# Patient Record
Sex: FEMALE | Race: WHITE | NOT HISPANIC OR LATINO | Employment: UNEMPLOYED | URBAN - METROPOLITAN AREA
[De-identification: names, ages, dates, MRNs, and addresses within clinical notes are randomized per-mention and may not be internally consistent; named-entity substitution may affect disease eponyms.]

---

## 2021-08-02 ENCOUNTER — OFFICE VISIT (OUTPATIENT)
Dept: FAMILY MEDICINE CLINIC | Facility: CLINIC | Age: 21
End: 2021-08-02

## 2021-08-02 ENCOUNTER — TELEPHONE (OUTPATIENT)
Dept: FAMILY MEDICINE CLINIC | Facility: CLINIC | Age: 21
End: 2021-08-02

## 2021-08-02 VITALS
DIASTOLIC BLOOD PRESSURE: 77 MMHG | OXYGEN SATURATION: 96 % | BODY MASS INDEX: 19.29 KG/M2 | HEART RATE: 76 BPM | SYSTOLIC BLOOD PRESSURE: 115 MMHG | HEIGHT: 66 IN | WEIGHT: 120 LBS

## 2021-08-02 DIAGNOSIS — L70.0 ACNE VULGARIS: Primary | ICD-10-CM

## 2021-08-02 PROCEDURE — 99203 OFFICE O/P NEW LOW 30 MIN: CPT | Performed by: NURSE PRACTITIONER

## 2021-08-02 RX ORDER — DOXYCYCLINE 100 MG/1
100 TABLET ORAL 2 TIMES DAILY
Qty: 20 TABLET | Refills: 0 | Status: SHIPPED | OUTPATIENT
Start: 2021-08-02 | End: 2021-08-02 | Stop reason: CLARIF

## 2021-08-02 RX ORDER — DOXYCYCLINE HYCLATE 100 MG/1
100 CAPSULE ORAL 2 TIMES DAILY
Qty: 20 CAPSULE | Refills: 0 | Status: SHIPPED | OUTPATIENT
Start: 2021-08-02 | End: 2021-08-04 | Stop reason: CLARIF

## 2021-08-02 NOTE — PATIENT INSTRUCTIONS
Acne    Acne is a skin problem that causes small, red bumps (pimples) and other skin changes. The skin has tiny holes called pores. Each pore has an oil gland. Acne happens when the pores get blocked. The pores may become red, sore, and swollen. They may also become infected. Acne is common among teenagers. Acne usually goes away over time.  What are the causes?  This condition may be caused when:  · Oil glands get blocked by oil, dead skin cells, and dirt.  · Bacteria that live in the oil glands increase in number and cause infection.  Acne can start with changes in hormones. These changes can occur:  · When children mature into their teens (adolescence).  · When women get their period (menstrual cycle).  · When women are pregnant.  Some things can make acne worse. They include:  · Cosmetics and hair products that have oil in them.  · Stress.  · Diseases that cause changes in hormones.  · Some medicines.  · Headbands, backpacks, or shoulder pads.  · Being near certain oils and chemicals.  · Foods that are high in sugars. These include dairy products, sweets, and chocolates.  What increases the risk?  You are more likely to develop this condition if:  · You are a teenager.  · You have a family history of acne.  What are the signs or symptoms?  Symptoms of this condition include:  · Small, red bumps (pimples or papules).  · Whiteheads.  · Blackheads.  · Small, pus-filled pimples (pustules).  · Big, red pimples or pustules that feel tender.  Acne that is very bad can cause:  · An abscess. This is an area that has pus.  · Cysts. These are hard, painful sacs that have fluid.  · Scars. These can happen after large pimples heal.  How is this treated?  Treatment for this condition depends on how bad your acne is. It may include:  · Creams and lotions. These can:  ? Keep the pores of your skin open.  ? Prevent infections and swelling.  · Medicines that treat infections (antibiotics). These can be put on your skin or taken  as pills.  · Pills that decrease the amount of oil in your skin.  · Birth control pills.  · Light or laser treatments.  · Shots of medicine into the areas with acne.  · Chemicals that cause the skin to peel.  · Surgery.  Follow these instructions at home:  Good skin care is the most important thing you can do to treat your acne. Take care of your skin as told by your doctor. You may be told to do these things:  · Wash your skin gently at least two times each day. You should also wash your skin:  ? After you exercise.  ? Before you go to bed.  · Use mild soap.  · Use a water-based skin moisturizer after you wash your skin.  · Use a sunscreen or sunblock with SPF 30 or greater. This is very important if you are using acne medicines.  · Choose cosmetics that will not block your oil glands (are noncomedogenic).  Medicines  · Take over-the-counter and prescription medicines only as told by your doctor.  · If you were prescribed an antibiotic medicine, use it or take it as told by your doctor. Do not stop using the antibiotic even if your acne gets better.  General instructions  · Keep your hair clean and off your face. Shampoo your hair on a regular basis. If you have oily hair, you may need to wash it every day.  · Avoid wearing tight headbands or hats.  · Avoid picking or squeezing your pimples. That can make your acne worse and cause it to scar.  · Shave gently. Only shave when you have to.  · Keep a food journal. This can help you see if any foods are linked to your acne.  · Keep all follow-up visits as told by your doctor. This is important.  Contact a doctor if:  · Your acne is not better after eight weeks.  · Your acne gets worse.  · You have a large area of skin that is red or tender.  · You think that you are having side effects from any acne medicine.  Summary  · Acne is a skin problem that causes pimples. Acne is common among teenagers. Acne usually goes away over time.  · Acne starts with changes in your  hormones. Other causes include stress, diet, and some medicines.  · Follow your doctor's instructions on how to take care of your skin. Good skin care is the most important thing you can do to treat your acne.  · Take over-the-counter and prescription medicines only as told by your doctor.  · Contact your doctor if you think that you are having side effects from any acne medicine.  This information is not intended to replace advice given to you by your health care provider. Make sure you discuss any questions you have with your health care provider.  Document Revised: 04/30/2019 Document Reviewed: 04/30/2019  Elsevier Patient Education © 2021 [employer]

## 2021-08-02 NOTE — TELEPHONE ENCOUNTER
[facility] CALLED AND STATED PATIENTS INSURANCE WILL ONLY COVER CAPSULES  FOR doxycycline (ADOXA). PHARMACY WOULD LIKE A NEW RX FOR CAPSULES AND NOT TABLETS     CALL BACK   [phone]

## 2021-08-02 NOTE — PROGRESS NOTES
Subjective   Jing Vazquez is a 20 y.o. female.       HPI   Pt. is new to our office today.   Medical/social/family hx reviewed.   No previous PCP.   Immunizations UTD.   Has epilepsy and is followed by peds neuro at [facility].  They have her doing a trial without meds.  Has not had a seizure in several months.    She has concern with acne; mainly face, some on back for months.  Has used several products otc without relief.  Uses a grapefruit scrub to wash her face daily; also using an astringent and acne pads daily.  Does use makeup; mixes foundations in order to cover acne.      The following portions of the patient's history were reviewed and updated as appropriate: allergies, current medications, past family history, past medical history, past social history, past surgical history and problem list.    Review of Systems   Constitutional: Negative for activity change, appetite change, chills, diaphoresis, fatigue, fever, unexpected weight gain and unexpected weight loss.   Eyes: Negative for visual disturbance.   Respiratory: Negative for cough, chest tightness, shortness of breath and wheezing.    Cardiovascular: Negative for chest pain, palpitations and leg swelling.   Gastrointestinal: Negative for abdominal pain, blood in stool, constipation, diarrhea, nausea, vomiting and indigestion.   Genitourinary: Negative for dysuria, flank pain, frequency and urgency.   Musculoskeletal: Negative for arthralgias, back pain and myalgias.   Skin: Positive for rash (acne).   Neurological: Negative for dizziness, seizures, syncope, weakness, light-headedness, headache and confusion.   Psychiatric/Behavioral: Negative for depressed mood. The patient is not nervous/anxious.        Objective   Physical Exam  Vitals reviewed.   Constitutional:       General: She is not in acute distress.     Appearance: Normal appearance.   HENT:      Head: Normocephalic and atraumatic.   Cardiovascular:      Rate and Rhythm: Normal rate and  regular rhythm.      Pulses: Normal pulses.      Heart sounds: Normal heart sounds. No murmur heard.     Pulmonary:      Effort: Pulmonary effort is normal. No respiratory distress.      Breath sounds: Normal breath sounds. No wheezing.   Chest:      Chest wall: No tenderness.   Abdominal:      Tenderness: There is no right CVA tenderness or left CVA tenderness.   Musculoskeletal:      Cervical back: Normal range of motion and neck supple. No tenderness.      Right lower leg: No edema.      Left lower leg: No edema.   Skin:     General: Skin is warm and dry.      Findings: No erythema.      Comments: Multiple acne comedones noted across forehead and cheeks; a few noted on upper back.     Neurological:      General: No focal deficit present.      Mental Status: She is alert and oriented to person, place, and time.   Psychiatric:         Mood and Affect: Mood normal.         Speech: Speech normal.         Behavior: Behavior normal. Behavior is cooperative.         Thought Content: Thought content normal.           Assessment/Plan   Diagnoses and all orders for this visit:    1. Acne vulgaris (Primary)  Comments:  Given doxycycline.   Encouarged to use Cetaphil wash and a non-comdogenic moisturizer daily. Avoid harsh/abrasive washes.   Referral to Derm  Orders:  -     Ambulatory Referral to Dermatology  -     doxycycline (ADOXA) 100 MG tablet; Take 1 tablet by mouth 2 (Two) Times a Day for 10 days.  Dispense: 20 tablet; Refill: 0

## 2021-08-04 ENCOUNTER — TELEPHONE (OUTPATIENT)
Dept: FAMILY MEDICINE CLINIC | Facility: CLINIC | Age: 21
End: 2021-08-04

## 2021-08-04 DIAGNOSIS — L70.0 ACNE VULGARIS: Primary | ICD-10-CM

## 2021-08-04 RX ORDER — CEPHALEXIN 500 MG/1
500 CAPSULE ORAL 2 TIMES DAILY
Qty: 28 CAPSULE | Refills: 0 | Status: SHIPPED | OUTPATIENT
Start: 2021-08-04 | End: 2021-08-18

## 2021-08-04 NOTE — TELEPHONE ENCOUNTER
PATIENTS GRANDMOTHER SARAVANAN STATES THE PHARMACY WONT FILL THE MED>doxycycline (VIBRAMYCIN) 100 MG capsule  BAILEE PRESCRIBED PATIENT ON 08/02 BECAUSE IT ISN'T COVERED BY HER INSURANCE. THE PHARMACY INSTRUCTED THEM TO CALL US HER PCP AND REQUEST US TO CALL INSURANCE AND EXPLAIN WHY ITS NECESSARY.... PLEASE STRAIGHTEN IT OUT SO PATIENT CAN BEGIN MED ASAP.    PATIENTS GRANDMOTHER CAN BE REACHED AT: [phone]

## 2021-08-04 NOTE — TELEPHONE ENCOUNTER
The tabs were initially sent in but pharmacy sent a message earlier in the week that only capsules would be covered.  I have just sent a different abx in for her.

## 2021-09-20 ENCOUNTER — OFFICE VISIT (OUTPATIENT)
Dept: FAMILY MEDICINE CLINIC | Facility: CLINIC | Age: 21
End: 2021-09-20

## 2021-09-20 VITALS
TEMPERATURE: 98.2 F | SYSTOLIC BLOOD PRESSURE: 113 MMHG | HEART RATE: 92 BPM | DIASTOLIC BLOOD PRESSURE: 74 MMHG | RESPIRATION RATE: 16 BRPM | OXYGEN SATURATION: 97 % | WEIGHT: 123 LBS | BODY MASS INDEX: 19.77 KG/M2 | HEIGHT: 66 IN

## 2021-09-20 DIAGNOSIS — Z00.00 ROUTINE ADULT HEALTH MAINTENANCE: Primary | ICD-10-CM

## 2021-09-20 PROBLEM — G40.A09 JUVENILE ABSENCE EPILEPSY (HCC): Status: ACTIVE | Noted: 2021-09-20

## 2021-09-20 PROCEDURE — 99395 PREV VISIT EST AGE 18-39: CPT | Performed by: PHYSICIAN ASSISTANT

## 2021-09-20 RX ORDER — DOXYCYCLINE 100 MG/1
100 CAPSULE ORAL 2 TIMES DAILY
COMMUNITY
Start: 2021-08-05

## 2021-09-20 NOTE — PROGRESS NOTES
"Subjective   Jing Vazquez is a 20 y.o. female.     Pt is here for her annual physical.  She is applying to the hospital to be a COVID-19 screening.  She has had the COVID-19 vaccine.  Sleeping well.  Eats well but not exercising.  Has hx of epilepsy but no seizures since 7th or 8th grade.  LMP is current.         The following portions of the patient's history were reviewed and updated as appropriate: allergies, current medications, past family history, past medical history, past social history, past surgical history and problem list.  Past Medical History:   Diagnosis Date   • Epilepsy (CMS/[facility])     Lorena Napoles at [facility]     No past surgical history on file.  No family history on file.  Social History     Socioeconomic History   • Marital status: Single     Spouse name: Not on file   • Number of children: Not on file   • Years of education: Not on file   • Highest education level: Not on file   Tobacco Use   • Smoking status: Never Smoker   • Smokeless tobacco: Never Used   Substance and Sexual Activity   • Alcohol use: Never   • Drug use: Never         Current Outpatient Medications:   •  doxycycline (MONODOX) 100 MG capsule, Take 100 mg by mouth 2 (Two) Times a Day., Disp: , Rfl:     Review of Systems   Constitutional: Negative for activity change, appetite change, chills, diaphoresis, fatigue, fever, unexpected weight gain and unexpected weight loss.   Respiratory: Negative for cough, chest tightness and shortness of breath.    Cardiovascular: Negative for chest pain, palpitations and leg swelling.   Gastrointestinal: Negative for abdominal pain, constipation, diarrhea, nausea and vomiting.   Musculoskeletal: Negative for gait problem.   Neurological: Negative for dizziness, seizures, weakness, light-headedness, headache and confusion.     /74 (BP Location: Right arm, Patient Position: Sitting, Cuff Size: Adult)   Pulse 92   Temp 98.2 °F (36.8 °C) (Temporal)   Resp 16   Ht 167.6 cm (66\") "   Wt 55.8 kg (123 lb)   SpO2 97%   BMI 19.85 kg/m²       Objective   Physical Exam  Vitals and nursing note reviewed.   Constitutional:       General: She is not in acute distress.     Appearance: Normal appearance. She is normal weight.   HENT:      Head: Normocephalic and atraumatic.      Right Ear: Tympanic membrane, ear canal and external ear normal.      Left Ear: Tympanic membrane, ear canal and external ear normal.      Nose: Nose normal.      Mouth/Throat:      Mouth: Mucous membranes are moist.      Pharynx: Oropharynx is clear.   Eyes:      Extraocular Movements: Extraocular movements intact.      Conjunctiva/sclera: Conjunctivae normal.      Pupils: Pupils are equal, round, and reactive to light.   Neck:      Thyroid: No thyroid mass, thyromegaly or thyroid tenderness.   Cardiovascular:      Rate and Rhythm: Normal rate and regular rhythm.      Heart sounds: Normal heart sounds.   Pulmonary:      Effort: Pulmonary effort is normal. No respiratory distress.      Breath sounds: Normal breath sounds. No wheezing, rhonchi or rales.   Abdominal:      General: Abdomen is flat. Bowel sounds are normal. There is no distension.      Palpations: Abdomen is soft. There is no mass.      Tenderness: There is no abdominal tenderness.   Musculoskeletal:         General: Normal range of motion.      Cervical back: Normal range of motion and neck supple.      Right lower leg: No edema.      Left lower leg: No edema.   Skin:     General: Skin is warm and dry.   Neurological:      General: No focal deficit present.      Mental Status: She is alert and oriented to person, place, and time.   Psychiatric:         Mood and Affect: Mood normal.         Behavior: Behavior normal.         Thought Content: Thought content normal.         Procedures     Assessment/Plan   Diagnoses and all orders for this visit:    1. Routine adult health maintenance (Primary)      Routine physical done today.  Encouraged her to work on adding in  regular exercise at least 5 days per week 20 minutes each time.    Pt not sure if she needs to go to [facility] for labs for preemployment but will find out and let me know.  If she can get them done here, I will order them and she can do them here.

## 2021-12-02 ENCOUNTER — TRANSCRIBE ORDERS (OUTPATIENT)
Dept: ADMINISTRATIVE | Facility: HOSPITAL | Age: 21
End: 2021-12-02

## 2021-12-02 ENCOUNTER — LAB (OUTPATIENT)
Dept: LAB | Facility: HOSPITAL | Age: 21
End: 2021-12-02

## 2021-12-02 DIAGNOSIS — Z11.52 ENCOUNTER FOR SCREENING FOR SEVERE ACUTE RESPIRATORY SYNDROME CORONAVIRUS 2 (SARS-COV-2) INFECTION: ICD-10-CM

## 2021-12-02 DIAGNOSIS — Z11.52 ENCOUNTER FOR SCREENING FOR SEVERE ACUTE RESPIRATORY SYNDROME CORONAVIRUS 2 (SARS-COV-2) INFECTION: Primary | ICD-10-CM

## 2021-12-02 LAB — SARS-COV-2 ORF1AB RESP QL NAA+PROBE: DETECTED

## 2021-12-02 PROCEDURE — C9803 HOPD COVID-19 SPEC COLLECT: HCPCS

## 2021-12-02 PROCEDURE — U0004 COV-19 TEST NON-CDC HGH THRU: HCPCS

## 2024-01-09 ENCOUNTER — OFFICE VISIT (OUTPATIENT)
Dept: FAMILY MEDICINE CLINIC | Facility: CLINIC | Age: 24
End: 2024-01-09
Payer: MEDICAID

## 2024-01-09 VITALS
HEIGHT: 66 IN | TEMPERATURE: 98.7 F | WEIGHT: 115 LBS | HEART RATE: 67 BPM | SYSTOLIC BLOOD PRESSURE: 132 MMHG | OXYGEN SATURATION: 100 % | DIASTOLIC BLOOD PRESSURE: 85 MMHG | BODY MASS INDEX: 18.48 KG/M2

## 2024-01-09 DIAGNOSIS — Z30.09 BIRTH CONTROL COUNSELING: Primary | ICD-10-CM

## 2024-01-09 DIAGNOSIS — Z13.41 ENCOUNTER FOR AUTISM SCREENING: ICD-10-CM

## 2024-01-09 LAB
B-HCG UR QL: NEGATIVE
EXPIRATION DATE: NORMAL
INTERNAL NEGATIVE CONTROL: NORMAL
INTERNAL POSITIVE CONTROL: NORMAL
Lab: NORMAL

## 2024-01-09 PROCEDURE — 99213 OFFICE O/P EST LOW 20 MIN: CPT | Performed by: NURSE PRACTITIONER

## 2024-01-09 PROCEDURE — 81025 URINE PREGNANCY TEST: CPT | Performed by: NURSE PRACTITIONER

## 2024-01-09 RX ORDER — MEDROXYPROGESTERONE ACETATE 150 MG/ML
150 INJECTION, SUSPENSION INTRAMUSCULAR ONCE
Status: DISCONTINUED | OUTPATIENT
Start: 2024-01-09 | End: 2024-01-09

## 2024-01-09 RX ORDER — MEDROXYPROGESTERONE ACETATE 150 MG/ML
150 INJECTION, SUSPENSION INTRAMUSCULAR ONCE
Status: COMPLETED | OUTPATIENT
Start: 2024-01-09 | End: 2024-01-09

## 2024-01-09 RX ADMIN — MEDROXYPROGESTERONE ACETATE 150 MG: 150 INJECTION, SUSPENSION INTRAMUSCULAR at 16:01

## 2024-01-09 NOTE — PROGRESS NOTES
"Chief Complaint  Contraception and Autism testing    Subjective        Jing Vazquez presents to [facility]  History of Present Illness  Jing is a 23-year-old female presenting today to discuss birth control options. She is interested in the depo shot. She previously tried Nexplanon while in high school and had negative side effects. She just got into a relationship and would like to start birth control. She is not currently sexually active. She denies any history/family history of breast cancer. She would also like to get tested for autism.        The following portions of the patient's history were reviewed and updated as appropriate: allergies, current medications, past family history, past medical history, past social history, past surgical history and problem list.    No Known Allergies    Patient Active Problem List   Diagnosis    Juvenile absence epilepsy       Current Outpatient Medications   Medication Instructions    doxycycline (MONODOX) 100 mg, 2 Times Daily          Objective   Vital Signs:  /85 (BP Location: Left arm, Patient Position: Sitting, Cuff Size: Adult)   Pulse 67   Temp 98.7 °F (37.1 °C) (Temporal)   Ht 167.6 cm (66\")   Wt 52.2 kg (115 lb)   SpO2 100%   BMI 18.56 kg/m²   Estimated body mass index is 18.56 kg/m² as calculated from the following:    Height as of this encounter: 167.6 cm (66\").    Weight as of this encounter: 52.2 kg (115 lb).             Review of Systems   Constitutional:  Negative for appetite change, diaphoresis, fatigue and unexpected weight change.   Eyes:  Negative for visual disturbance.   Respiratory:  Negative for cough, chest tightness, shortness of breath and wheezing.    Cardiovascular:  Negative for chest pain, palpitations and leg swelling.   Gastrointestinal:  Negative for nausea and vomiting.   Musculoskeletal:  Negative for back pain and gait problem.   Neurological:  Negative for dizziness, syncope, weakness, " light-headedness, numbness and headaches.   Psychiatric/Behavioral:  Negative for confusion. The patient is not nervous/anxious.         Physical Exam  Constitutional:       Appearance: Normal appearance.   Cardiovascular:      Rate and Rhythm: Normal rate and regular rhythm.      Pulses: Normal pulses.      Heart sounds: Normal heart sounds.   Pulmonary:      Effort: Pulmonary effort is normal.      Breath sounds: Normal breath sounds.   Abdominal:      General: Abdomen is flat. Bowel sounds are normal.      Palpations: Abdomen is soft.   Skin:     General: Skin is warm and dry.      Capillary Refill: Capillary refill takes less than 2 seconds.   Neurological:      Mental Status: She is alert and oriented to person, place, and time.   Psychiatric:         Mood and Affect: Mood normal.         Behavior: Behavior normal.         Thought Content: Thought content normal.         Judgment: Judgment normal.        Result Review :                   Assessment and Plan   Diagnoses and all orders for this visit:    1. Birth control counseling (Primary)  Comments:  HCG negative  Depo given  discussed side effects and dosing schedule  pt voiced understanding  Orders:  -     Ambulatory Referral to Obstetrics / Gynecology  -     POC Pregnancy, Urine    2. Encounter for autism screening  Comments:  referral made for testing  Orders:  -     Ambulatory Referral to Psychotherapy             Follow Up   Return in about 13 weeks (around 4/9/2024) for nurse visit for depo shot.  Patient was given instructions and counseling regarding her condition or for health maintenance advice. Please see specific information pulled into the AVS if appropriate.

## 2024-04-16 ENCOUNTER — CLINICAL SUPPORT (OUTPATIENT)
Dept: FAMILY MEDICINE CLINIC | Facility: CLINIC | Age: 24
End: 2024-04-16
Payer: COMMERCIAL

## 2024-04-16 DIAGNOSIS — Z30.09 BIRTH CONTROL COUNSELING: Primary | ICD-10-CM

## 2024-04-16 PROCEDURE — 96372 THER/PROPH/DIAG INJ SC/IM: CPT | Performed by: NURSE PRACTITIONER

## 2024-04-16 RX ORDER — MEDROXYPROGESTERONE ACETATE 150 MG/ML
150 INJECTION, SUSPENSION INTRAMUSCULAR ONCE
Status: COMPLETED | OUTPATIENT
Start: 2024-04-16 | End: 2024-04-16

## 2024-04-16 RX ADMIN — MEDROXYPROGESTERONE ACETATE 150 MG: 150 INJECTION, SUSPENSION INTRAMUSCULAR at 11:28

## 2024-06-17 ENCOUNTER — HOSPITAL ENCOUNTER (EMERGENCY)
Facility: HOSPITAL | Age: 24
Discharge: HOME | End: 2024-06-17
Attending: EMERGENCY MEDICINE | Admitting: EMERGENCY MEDICINE
Payer: COMMERCIAL

## 2024-06-17 VITALS
BODY MASS INDEX: 18.48 KG/M2 | HEART RATE: 87 BPM | WEIGHT: 115 LBS | RESPIRATION RATE: 16 BRPM | DIASTOLIC BLOOD PRESSURE: 95 MMHG | SYSTOLIC BLOOD PRESSURE: 140 MMHG | TEMPERATURE: 98 F | HEIGHT: 66 IN | OXYGEN SATURATION: 100 %
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
## 2024-06-17 DIAGNOSIS — Z77.21 EXPOSURE TO BODY FLUIDS BY CONTAMINATED HYPODERMIC NEEDLE STICK: Primary | ICD-10-CM

## 2024-06-17 LAB
HAV IGM SERPL QL IA: NORMAL
HBV CORE IGM SERPL QL IA: NORMAL
HBV SURFACE AG SERPL QL IA: NORMAL
HCV AB SER QL: NORMAL
HIV 1+2 AB+HIV1 P24 AG SERPL QL IA: NORMAL

## 2024-06-17 PROCEDURE — 36415 COLL VENOUS BLD VENIPUNCTURE: CPT

## 2024-06-17 PROCEDURE — 99283 EMERGENCY DEPT VISIT LOW MDM: CPT

## 2024-06-17 PROCEDURE — 80074 ACUTE HEPATITIS PANEL: CPT | Performed by: EMERGENCY MEDICINE

## 2024-06-17 PROCEDURE — G0432 EIA HIV-1/HIV-2 SCREEN: HCPCS | Performed by: EMERGENCY MEDICINE

## 2024-06-17 NOTE — ED PROVIDER NOTES
"Subjective   History of Present Illness  23-year-old female presents after getting right fifth digit stuck with a needle when picking up a trash bag.  Needle sticking out of it.  Had very slight amount of bleeding.  No numbness or weakness.  Review of Systems  See HPI.  Past Medical History:   Diagnosis Date    Epilepsy     Lorena Napoles at [facility]       No Known Allergies    History reviewed. No pertinent surgical history.    History reviewed. No pertinent family history.    Social History     Socioeconomic History    Marital status: Single   Tobacco Use    Smoking status: Never     Passive exposure: Never    Smokeless tobacco: Never   Substance and Sexual Activity    Alcohol use: Never    Drug use: Never           Objective   Physical Exam  Punctate wound on right fifth digit.  No active bleeding.  Sensation intact to light touch distally.  Normal range of motion.  Procedures           ED Course      /95 (BP Location: Left arm, Patient Position: Sitting)   Pulse 87   Temp 98 °F (36.7 °C) (Oral)   Resp 16   Ht 167.6 cm (66\")   Wt 52.2 kg (115 lb)   SpO2 100%   BMI 18.56 kg/m²   Labs Reviewed   HEPATITIS PANEL, ACUTE - Normal    Narrative:     Results may be falsely decreased if patient taking Biotin.    HIV-1/O/2 ANTIGEN/ANTIBODY - Normal    Narrative:     The HIV antibody/antigen combo assay is a qualitative assay for HIV that includes the p24 antigen as well as antibodies to HIV types 1 and 2. This test is intended to be used as a screening assay in the diagnosis of HIV infection in patients over the age of 2.   HIV-1 AND HIV-2 ANTIBODIES    Narrative:     The following orders were created for panel order HIV-1 & HIV-2 Antibodies.  Procedure                               Abnormality         Status                     ---------                               -----------         ------                     HIV-1 / O / 2 Ag / Antibody[[id number]]  Normal              Final result             "     Please view results for these tests on the individual orders.     Medications - No data to display  No radiology results for the last day                                         Medical Decision Making  Problems Addressed:  Exposure to body fluids by contaminated hypodermic needle stick: acute illness or injury    Discussed risks and benefits of HIV prophylaxis and recommended against giving nature of needlestick.  Patient agreeable with this plan.  DC'd home.  Recommended repeat testing in 3 months.    Final diagnoses:   Exposure to body fluids by contaminated hypodermic needle stick       ED Disposition  ED Disposition       ED Disposition   Discharge    Condition   Stable    Comment   --               Kelley Elmore, APRN  [address] [place] [id number] [phone]      As needed         Medication List      No changes were made to your prescriptions during this visit.            Owen Hsu MD  06/17/24 1004

## 2024-07-18 ENCOUNTER — CLINICAL SUPPORT (OUTPATIENT)
Dept: FAMILY MEDICINE CLINIC | Facility: CLINIC | Age: 24
End: 2024-07-18
Payer: COMMERCIAL

## 2024-07-18 DIAGNOSIS — Z30.42 ENCOUNTER FOR SURVEILLANCE OF INJECTABLE CONTRACEPTIVE: Primary | ICD-10-CM

## 2024-07-18 PROCEDURE — 96372 THER/PROPH/DIAG INJ SC/IM: CPT

## 2024-07-18 PROCEDURE — 81025 URINE PREGNANCY TEST: CPT

## 2024-07-18 RX ORDER — MEDROXYPROGESTERONE ACETATE 150 MG/ML
150 INJECTION, SUSPENSION INTRAMUSCULAR ONCE
Status: COMPLETED | OUTPATIENT
Start: 2024-07-18 | End: 2024-07-18

## 2024-07-18 RX ADMIN — MEDROXYPROGESTERONE ACETATE 150 MG: 150 INJECTION, SUSPENSION INTRAMUSCULAR at 14:27

## 2024-10-24 ENCOUNTER — CLINICAL SUPPORT (OUTPATIENT)
Dept: FAMILY MEDICINE CLINIC | Facility: CLINIC | Age: 24
End: 2024-10-24
Payer: COMMERCIAL

## 2024-10-24 DIAGNOSIS — Z30.42 ENCOUNTER FOR SURVEILLANCE OF INJECTABLE CONTRACEPTIVE: Primary | ICD-10-CM

## 2024-10-24 PROCEDURE — 96372 THER/PROPH/DIAG INJ SC/IM: CPT | Performed by: NURSE PRACTITIONER

## 2024-10-24 RX ORDER — MEDROXYPROGESTERONE ACETATE 150 MG/ML
150 INJECTION, SUSPENSION INTRAMUSCULAR ONCE
Status: COMPLETED | OUTPATIENT
Start: 2024-10-24 | End: 2024-10-24

## 2024-10-24 RX ADMIN — MEDROXYPROGESTERONE ACETATE 150 MG: 150 INJECTION, SUSPENSION INTRAMUSCULAR at 15:19

## 2024-11-24 ENCOUNTER — APPOINTMENT (OUTPATIENT)
Dept: MRI IMAGING | Facility: HOSPITAL | Age: 24
End: 2024-11-24
Payer: COMMERCIAL

## 2024-11-24 ENCOUNTER — HOSPITAL ENCOUNTER (OUTPATIENT)
Facility: HOSPITAL | Age: 24
Discharge: HOME | End: 2024-11-24
Attending: EMERGENCY MEDICINE | Admitting: NURSE PRACTITIONER
Payer: COMMERCIAL

## 2024-11-24 ENCOUNTER — APPOINTMENT (OUTPATIENT)
Dept: CT IMAGING | Facility: HOSPITAL | Age: 24
End: 2024-11-24
Payer: COMMERCIAL

## 2024-11-24 VITALS
WEIGHT: 108.47 LBS | RESPIRATION RATE: 21 BRPM | DIASTOLIC BLOOD PRESSURE: 83 MMHG | BODY MASS INDEX: 17.43 KG/M2 | HEART RATE: 83 BPM | OXYGEN SATURATION: 100 % | TEMPERATURE: 98.3 F | SYSTOLIC BLOOD PRESSURE: 123 MMHG | HEIGHT: 66 IN

## 2024-11-24 DIAGNOSIS — G44.209 ACUTE NON INTRACTABLE TENSION-TYPE HEADACHE: ICD-10-CM

## 2024-11-24 DIAGNOSIS — G40.A09 JUVENILE ABSENCE EPILEPSY: ICD-10-CM

## 2024-11-24 DIAGNOSIS — R47.9 SPEECH DISTURBANCE, UNSPECIFIED TYPE: Primary | ICD-10-CM

## 2024-11-24 LAB
ALBUMIN SERPL-MCNC: 5.3 G/DL (ref 3.5–5.2)
ALBUMIN/GLOB SERPL: 1.7 G/DL
ALP SERPL-CCNC: 65 U/L (ref 39–117)
ALT SERPL W P-5'-P-CCNC: 16 U/L (ref 1–33)
ANION GAP SERPL CALCULATED.3IONS-SCNC: 13.9 MMOL/L (ref 5–15)
AST SERPL-CCNC: 22 U/L (ref 1–32)
BASOPHILS # BLD AUTO: 0.04 10*3/MM3 (ref 0–0.2)
BASOPHILS NFR BLD AUTO: 0.7 % (ref 0–1.5)
BILIRUB SERPL-MCNC: 0.5 MG/DL (ref 0–1.2)
BUN SERPL-MCNC: 18 MG/DL (ref 6–20)
BUN/CREAT SERPL: 26.9 (ref 7–25)
CALCIUM SPEC-SCNC: 10.3 MG/DL (ref 8.6–10.5)
CHLORIDE SERPL-SCNC: 101 MMOL/L (ref 98–107)
CO2 SERPL-SCNC: 25.1 MMOL/L (ref 22–29)
CREAT SERPL-MCNC: 0.67 MG/DL (ref 0.57–1)
D-LACTATE SERPL-SCNC: 0.7 MMOL/L (ref 0.3–2)
DEPRECATED RDW RBC AUTO: 42.1 FL (ref 37–54)
EGFRCR SERPLBLD CKD-EPI 2021: 126.9 ML/MIN/1.73
EOSINOPHIL # BLD AUTO: 0.08 10*3/MM3 (ref 0–0.4)
EOSINOPHIL NFR BLD AUTO: 1.3 % (ref 0.3–6.2)
ERYTHROCYTE [DISTWIDTH] IN BLOOD BY AUTOMATED COUNT: 12.4 % (ref 12.3–15.4)
GLOBULIN UR ELPH-MCNC: 3.2 GM/DL
GLUCOSE SERPL-MCNC: 99 MG/DL (ref 65–99)
HCG SERPL QL: NEGATIVE
HCT VFR BLD AUTO: 44.3 % (ref 34–46.6)
HGB BLD-MCNC: 14.8 G/DL (ref 12–15.9)
IMM GRANULOCYTES # BLD AUTO: 0.02 10*3/MM3 (ref 0–0.05)
IMM GRANULOCYTES NFR BLD AUTO: 0.3 % (ref 0–0.5)
LYMPHOCYTES # BLD AUTO: 1.98 10*3/MM3 (ref 0.7–3.1)
LYMPHOCYTES NFR BLD AUTO: 32.7 % (ref 19.6–45.3)
MCH RBC QN AUTO: 30.9 PG (ref 26.6–33)
MCHC RBC AUTO-ENTMCNC: 33.4 G/DL (ref 31.5–35.7)
MCV RBC AUTO: 92.5 FL (ref 79–97)
MONOCYTES # BLD AUTO: 0.46 10*3/MM3 (ref 0.1–0.9)
MONOCYTES NFR BLD AUTO: 7.6 % (ref 5–12)
NEUTROPHILS NFR BLD AUTO: 3.48 10*3/MM3 (ref 1.7–7)
NEUTROPHILS NFR BLD AUTO: 57.4 % (ref 42.7–76)
NRBC BLD AUTO-RTO: 0 /100 WBC (ref 0–0.2)
PLATELET # BLD AUTO: 218 10*3/MM3 (ref 140–450)
PMV BLD AUTO: 8.8 FL (ref 6–12)
POTASSIUM SERPL-SCNC: 4.1 MMOL/L (ref 3.5–5.2)
PROT SERPL-MCNC: 8.5 G/DL (ref 6–8.5)
RBC # BLD AUTO: 4.79 10*6/MM3 (ref 3.77–5.28)
SODIUM SERPL-SCNC: 140 MMOL/L (ref 136–145)
T4 FREE SERPL-MCNC: 1.35 NG/DL (ref 0.93–1.7)
TSH SERPL DL<=0.05 MIU/L-ACNC: 1.47 UIU/ML (ref 0.27–4.2)
WBC NRBC COR # BLD AUTO: 6.06 10*3/MM3 (ref 3.4–10.8)

## 2024-11-24 PROCEDURE — 96374 THER/PROPH/DIAG INJ IV PUSH: CPT

## 2024-11-24 PROCEDURE — 84439 ASSAY OF FREE THYROXINE: CPT | Performed by: EMERGENCY MEDICINE

## 2024-11-24 PROCEDURE — 80050 GENERAL HEALTH PANEL: CPT | Performed by: EMERGENCY MEDICINE

## 2024-11-24 PROCEDURE — 99285 EMERGENCY DEPT VISIT HI MDM: CPT

## 2024-11-24 PROCEDURE — 25010000002 DIPHENHYDRAMINE PER 50 MG: Performed by: EMERGENCY MEDICINE

## 2024-11-24 PROCEDURE — 25010000002 DIAZEPAM PER 5 MG: Performed by: EMERGENCY MEDICINE

## 2024-11-24 PROCEDURE — G0378 HOSPITAL OBSERVATION PER HR: HCPCS

## 2024-11-24 PROCEDURE — 70450 CT HEAD/BRAIN W/O DYE: CPT

## 2024-11-24 PROCEDURE — 84703 CHORIONIC GONADOTROPIN ASSAY: CPT | Performed by: EMERGENCY MEDICINE

## 2024-11-24 PROCEDURE — 70551 MRI BRAIN STEM W/O DYE: CPT

## 2024-11-24 PROCEDURE — 96375 TX/PRO/DX INJ NEW DRUG ADDON: CPT

## 2024-11-24 PROCEDURE — 83605 ASSAY OF LACTIC ACID: CPT | Performed by: EMERGENCY MEDICINE

## 2024-11-24 PROCEDURE — 25010000002 METOCLOPRAMIDE PER 10 MG: Performed by: EMERGENCY MEDICINE

## 2024-11-24 RX ORDER — SODIUM CHLORIDE 0.9 % (FLUSH) 0.9 %
10 SYRINGE (ML) INJECTION AS NEEDED
Status: DISCONTINUED | OUTPATIENT
Start: 2024-11-24 | End: 2024-11-24 | Stop reason: HOSPADM

## 2024-11-24 RX ORDER — NITROGLYCERIN 0.4 MG/1
0.4 TABLET SUBLINGUAL
Status: DISCONTINUED | OUTPATIENT
Start: 2024-11-24 | End: 2024-11-24 | Stop reason: HOSPADM

## 2024-11-24 RX ORDER — ONDANSETRON 2 MG/ML
4 INJECTION INTRAMUSCULAR; INTRAVENOUS EVERY 6 HOURS PRN
Status: DISCONTINUED | OUTPATIENT
Start: 2024-11-24 | End: 2024-11-24 | Stop reason: HOSPADM

## 2024-11-24 RX ORDER — MEDROXYPROGESTERONE ACETATE 150 MG/ML
150 INJECTION, SUSPENSION INTRAMUSCULAR
COMMUNITY

## 2024-11-24 RX ORDER — ONDANSETRON 4 MG/1
4 TABLET, ORALLY DISINTEGRATING ORAL EVERY 6 HOURS PRN
Status: DISCONTINUED | OUTPATIENT
Start: 2024-11-24 | End: 2024-11-24 | Stop reason: HOSPADM

## 2024-11-24 RX ORDER — BISACODYL 10 MG
10 SUPPOSITORY, RECTAL RECTAL DAILY PRN
Status: DISCONTINUED | OUTPATIENT
Start: 2024-11-24 | End: 2024-11-24 | Stop reason: HOSPADM

## 2024-11-24 RX ORDER — DIPHENHYDRAMINE HYDROCHLORIDE 50 MG/ML
12.5 INJECTION INTRAMUSCULAR; INTRAVENOUS ONCE
Status: COMPLETED | OUTPATIENT
Start: 2024-11-24 | End: 2024-11-24

## 2024-11-24 RX ORDER — DIHYDROERGOTAMINE MESYLATE 1 MG/ML
0.5 INJECTION, SOLUTION INTRAMUSCULAR; INTRAVENOUS; SUBCUTANEOUS EVERY 8 HOURS PRN
Status: DISCONTINUED | OUTPATIENT
Start: 2024-11-25 | End: 2024-11-24 | Stop reason: HOSPADM

## 2024-11-24 RX ORDER — AMOXICILLIN 250 MG
2 CAPSULE ORAL 2 TIMES DAILY PRN
Status: DISCONTINUED | OUTPATIENT
Start: 2024-11-24 | End: 2024-11-24 | Stop reason: HOSPADM

## 2024-11-24 RX ORDER — BISACODYL 5 MG/1
5 TABLET, DELAYED RELEASE ORAL DAILY PRN
Status: DISCONTINUED | OUTPATIENT
Start: 2024-11-24 | End: 2024-11-24 | Stop reason: HOSPADM

## 2024-11-24 RX ORDER — METOCLOPRAMIDE HYDROCHLORIDE 5 MG/ML
5 INJECTION INTRAMUSCULAR; INTRAVENOUS ONCE
Status: COMPLETED | OUTPATIENT
Start: 2024-11-24 | End: 2024-11-24

## 2024-11-24 RX ORDER — ACETAMINOPHEN 325 MG/1
650 TABLET ORAL EVERY 4 HOURS PRN
Status: DISCONTINUED | OUTPATIENT
Start: 2024-11-24 | End: 2024-11-24 | Stop reason: HOSPADM

## 2024-11-24 RX ORDER — METOCLOPRAMIDE HYDROCHLORIDE 5 MG/ML
10 INJECTION INTRAMUSCULAR; INTRAVENOUS EVERY 6 HOURS PRN
Status: DISCONTINUED | OUTPATIENT
Start: 2024-11-25 | End: 2024-11-24 | Stop reason: HOSPADM

## 2024-11-24 RX ORDER — POLYETHYLENE GLYCOL 3350 17 G/17G
17 POWDER, FOR SOLUTION ORAL DAILY PRN
Status: DISCONTINUED | OUTPATIENT
Start: 2024-11-24 | End: 2024-11-24 | Stop reason: HOSPADM

## 2024-11-24 RX ORDER — BUTALBITAL, ACETAMINOPHEN AND CAFFEINE 50; 325; 40 MG/1; MG/1; MG/1
1 TABLET ORAL EVERY 4 HOURS PRN
Qty: 6 TABLET | Refills: 0 | Status: SHIPPED | OUTPATIENT
Start: 2024-11-24

## 2024-11-24 RX ORDER — DIAZEPAM 10 MG/2ML
5 INJECTION, SOLUTION INTRAMUSCULAR; INTRAVENOUS ONCE
Status: COMPLETED | OUTPATIENT
Start: 2024-11-24 | End: 2024-11-24

## 2024-11-24 RX ORDER — SODIUM CHLORIDE 9 MG/ML
40 INJECTION, SOLUTION INTRAVENOUS AS NEEDED
Status: DISCONTINUED | OUTPATIENT
Start: 2024-11-24 | End: 2024-11-24 | Stop reason: HOSPADM

## 2024-11-24 RX ORDER — BUTALBITAL, ACETAMINOPHEN AND CAFFEINE 50; 325; 40 MG/1; MG/1; MG/1
1 TABLET ORAL EVERY 4 HOURS PRN
Status: DISCONTINUED | OUTPATIENT
Start: 2024-11-24 | End: 2024-11-24 | Stop reason: HOSPADM

## 2024-11-24 RX ORDER — HYDROXYZINE HYDROCHLORIDE 25 MG/1
25 TABLET, FILM COATED ORAL 3 TIMES DAILY PRN
Qty: 30 TABLET | Refills: 0 | Status: SHIPPED | OUTPATIENT
Start: 2024-11-24

## 2024-11-24 RX ORDER — ACETAMINOPHEN 160 MG/5ML
650 SOLUTION ORAL EVERY 4 HOURS PRN
Status: DISCONTINUED | OUTPATIENT
Start: 2024-11-24 | End: 2024-11-24 | Stop reason: HOSPADM

## 2024-11-24 RX ORDER — SODIUM CHLORIDE 0.9 % (FLUSH) 0.9 %
10 SYRINGE (ML) INJECTION EVERY 12 HOURS SCHEDULED
Status: DISCONTINUED | OUTPATIENT
Start: 2024-11-24 | End: 2024-11-24 | Stop reason: HOSPADM

## 2024-11-24 RX ORDER — ONDANSETRON 4 MG/1
4 TABLET, FILM COATED ORAL EVERY 8 HOURS PRN
Qty: 30 TABLET | Refills: 0 | Status: SHIPPED | OUTPATIENT
Start: 2024-11-24 | End: 2024-11-26

## 2024-11-24 RX ORDER — ACETAMINOPHEN 650 MG/1
650 SUPPOSITORY RECTAL EVERY 4 HOURS PRN
Status: DISCONTINUED | OUTPATIENT
Start: 2024-11-24 | End: 2024-11-24 | Stop reason: HOSPADM

## 2024-11-24 RX ADMIN — DIAZEPAM 5 MG: 10 INJECTION, SOLUTION INTRAMUSCULAR; INTRAVENOUS at 12:37

## 2024-11-24 RX ADMIN — METOCLOPRAMIDE 5 MG: 5 INJECTION, SOLUTION INTRAMUSCULAR; INTRAVENOUS at 14:51

## 2024-11-24 RX ADMIN — DIPHENHYDRAMINE HYDROCHLORIDE 12.5 MG: 50 INJECTION, SOLUTION INTRAMUSCULAR; INTRAVENOUS at 14:51

## 2024-11-24 NOTE — ED PROVIDER NOTES
"Subjective   History of Present Illness  Patient is a young female with a history of seizures who presents with symptoms of shaking and a headache. The patient has a history of febrile seizures as a child and was on medication until approximately four years ago when it was discontinued after normal brain wave tests. This morning, she experienced significant stress at work.  Afterward, sometime around 8AM, but unsure exactly what time, patient began shaking, feeling like you may pass out and a frontal headache that fluctuates in intensity. She denies any other medical problems and is not on any daily medications except for a birth control shot.  Review of Systems  See HPI.  Past Medical History:   Diagnosis Date    Epilepsy     Lorena Napoles at [facility]       No Known Allergies    History reviewed. No pertinent surgical history.    History reviewed. No pertinent family history.    Social History     Socioeconomic History    Marital status: Single   Tobacco Use    Smoking status: Never     Passive exposure: Never    Smokeless tobacco: Never   Vaping Use    Vaping status: Never Used   Substance and Sexual Activity    Alcohol use: Never    Drug use: Never           Objective   Physical Exam  General Appearance: Tearful  Eyes: PERRLA.  Neurological: Intact cranial nerves II-XII, no cerebellar signs, 5/5 strength in all extremities, sensation intact to light touch distally, diffuse tremor with intention, halting speech  Cardiovascular: Regular rate and rhythm.  Intact distal pulses  Respiratory: No increased work of breathing.  Gastrointestinal: Abdomen soft and non-tender.  Procedures           ED Course      /83 (BP Location: Right arm, Patient Position: Sitting)   Pulse 83   Temp 98.3 °F (36.8 °C) (Oral)   Resp 21   Ht 167.6 cm (66\")   Wt 49.2 kg (108 lb 7.5 oz)   SpO2 100%   BMI 17.51 kg/m²   Labs Reviewed   COMPREHENSIVE METABOLIC PANEL - Abnormal; Notable for the following components:       " Result Value    Albumin 5.3 (*)     BUN/Creatinine Ratio 26.9 (*)     All other components within normal limits    Narrative:     GFR Normal >60  Chronic Kidney Disease <60  Kidney Failure <15     HCG, SERUM, QUALITATIVE - Normal   TSH - Normal   T4, FREE - Normal   CBC WITH AUTO DIFFERENTIAL - Normal   POC LACTATE - Normal   CBC AND DIFFERENTIAL    Narrative:     The following orders were created for panel order CBC & Differential.  Procedure                               Abnormality         Status                     ---------                               -----------         ------                     CBC Auto Differential[[id number]]        Normal              Final result                 Please view results for these tests on the individual orders.     MRI Brain Without Contrast   Final Result   Impression:   Normal brain MRI.            Electronically Signed: Nilson Delgadillo MD     11/24/2024 2:45 PM EST     Workstation ID: [id number]      CT Head Without Contrast   Final Result   No acute intracranial abnormality by head CT.         Electronically Signed: Nilson Delgadillo MD     11/24/2024 12:30 PM EST     Workstation ID: [id number]                                                         Medical Decision Making      Reevaluated at 1:10 PM.  All symptoms have improved except for speech.  Ordered MRI    Suspect unlikely CVA given patient with both positive and negative symptoms.  Had event she states that caused her significant stress this morning after interaction with coworker.  Patient with headache and new tremor she states started around 8 AM but she is not exactly sure what time.    All imaging negative for acute findings.  Reassuring labs here.  Patient slightly significantly abnormal speech.  Will place in observation for neurology consultation.  Final diagnoses:   Speech disturbance, unspecified type   Juvenile absence epilepsy   Acute non intractable tension-type headache       ED Disposition  ED  Disposition       ED Disposition   Decision to Admit    Condition   --    Comment   --               Kelley Elmore, APRN  [address]  [address]  [place] [address]  [phone]    Schedule an appointment as soon as possible for a visit in 1 week(s)      Kelley Elmore, APRN  [address]  [address]  [place] [address]  [phone]      7-10 days    Kelley Elmore, APRN  [address]  [address]  [place] [address]  [phone]      7-10 days    Melita Stevenson, APRN  [address] [facility] [address]  [address]  [place] [address]  [phone]               Medication List        New Prescriptions      butalbital-acetaminophen-caffeine -40 MG per tablet  Commonly known as: FIORICET, ESGIC  Take 1 tablet by mouth Every 4 (Four) Hours As Needed for Headache.     hydrOXYzine 25 MG tablet  Commonly known as: ATARAX  Take 1 tablet by mouth 3 (Three) Times a Day As Needed for Anxiety.     ondansetron 4 MG tablet  Commonly known as: Zofran  Take 1 tablet by mouth Every 8 (Eight) Hours As Needed for Nausea or Vomiting.               Where to Get Your Medications        These medications were sent to [facility]/pharmacy #[id number] - [place], [place] - [phone] [phone] FX  [address] [place] [address]      Phone: [phone]   butalbital-acetaminophen-caffeine -40 MG per tablet  hydrOXYzine 25 MG tablet  ondansetron 4 MG tablet            Owen Hsu MD  11/25/24 0057

## 2024-11-25 ENCOUNTER — READMISSION MANAGEMENT (OUTPATIENT)
Dept: CALL CENTER | Facility: HOSPITAL | Age: 24
End: 2024-11-25
Payer: COMMERCIAL

## 2024-11-25 NOTE — PROGRESS NOTES
"Subjective   Jing Vazquez is a 23 y.o. female.       HPI   Pt here for ER follow up from [facility] on 11/24/25.  ER course per hospital records: \"Patient is a 23 y.o. female presented with speech disturbances. Patient was having difficulty forming words and making complete sentences upon arrival to ED. Patient has history of juvenile seizures.  Patient states she was on medication for years but discontinued medication per neurology as a teenager.  Patient states this morning she has significant stress at work and started experiencing shaking and near syncope as well as bilateral frontal headache.  Patient states no new change in medications.  CBC and CMP unremarkable.  CT of head showed no acute intercranial abnormality.  MRI brain was unremarkable.  Patient given Valium Benadryl and Reglan with migraine protocol. Neurology consultation made but patient wanted to return home and follow-up outpatient. Patient to take medication as prescribed and follow-up with PCP in 1-2 weeks. Tests and recommendations given to patient and agrees with plan. If symptoms worsen, patient to call 911 or go to nearest ED.\"     Symptoms started yesterday.  Thinks stress may have triggered symptoms.  Denies any injury.   Seen in ER but became anxious and uncomfortable and left before she could have the neuro consult.    Her CT head and MRI brain was normal.  Her mom doesn't trust the MRI equipment; concerned she may need another MRI.    She has hx of juvenile absence epilepsy   She has not been on medication for years.   She never had these symptoms in the past.    She is continuing to have tremors in extremities and stuttering with her speech.  No improvement.       The following portions of the patient's history were reviewed and updated as appropriate: allergies, current medications, past family history, past medical history, past social history, past surgical history, and problem list.    Review of Systems   Constitutional:  Negative for " chills, fatigue and fever.   Respiratory:  Negative for cough and shortness of breath.    Cardiovascular:  Negative for chest pain and palpitations.   Neurological:  Positive for tremors, speech difficulty, weakness and headache. Negative for syncope, facial asymmetry, numbness, memory problem and confusion.   Psychiatric/Behavioral:  Positive for stress. Negative for depressed mood. The patient is nervous/anxious.        Objective   Physical Exam  Vitals reviewed.   Constitutional:       Appearance: Normal appearance.   Neurological:      Mental Status: She is alert and oriented to person, place, and time.      Motor: Weakness and tremor present.      Coordination: Coordination abnormal.      Gait: Gait abnormal.   Psychiatric:         Mood and Affect: Mood is anxious.         Speech: Speech is slurred.         Behavior: Behavior is cooperative.                Procedures   Assessment & Plan   Diagnoses and all orders for this visit:    1. Speech disturbance, unspecified type (Primary)  Comments:  Discussed symptmos with patient and family.   Patient will go back to ER for neuro consult.    2. Tremor  Comments:  Discussed symptmos with patient and family.   Patient will go back to ER for neuro consult.    3. Juvenile absence epilepsy  Comments:  Discussed symptmos with patient and family.   Patient will go back to ER for neuro consult.

## 2024-11-25 NOTE — NURSING NOTE
Patient paper works signed. She left with family. IV remove. All belongings taken and D/C education done.

## 2024-11-25 NOTE — OUTREACH NOTE
Prep Survey      Flowsheet Row Responses   [facility] facility patient discharged from? [facility]   Is LACE score < 7 ? Yes   Eligibility Not Eligible   What are the reasons patient is not eligible? Other  [observation less than 8 hours]   Does the patient have one of the following disease processes/diagnoses(primary or secondary)? Other   Prep survey completed? Yes            Michelle TRIPLETT - Registered Nurse

## 2024-11-25 NOTE — CASE MANAGEMENT/SOCIAL WORK
Case Management Discharge Note      Final Note: Routine home         Selected Continued Care - Discharged on 11/24/2024 Admission date: 11/24/2024 - Discharge disposition: Home or Self Care       Transportation Services  Private: Car    Final Discharge Disposition Code: 01 - home or self-care

## 2024-11-25 NOTE — NURSING NOTE
Patient requesting to go home.  Imaging all negative for acute abnormalities.  Notified NP, D/C orders to be placed.  Educated patient to seek neurologist OP.

## 2024-11-25 NOTE — DISCHARGE SUMMARY
[facility]    Kelley Elmore, NAIDA    CHIEF COMPLAINT:     Speech disturbance     HISTORY OF PRESENT ILLNESS:    HPI    Patient is a young female with a history of seizures who presents with symptoms of shaking and a headache. The patient has a history of febrile seizures as a child and was on medication until approximately four years ago when it was discontinued after normal brain wave tests. This morning, she experienced significant stress at work.  Afterward, sometime around 8AM, but unsure exactly what time, patient began shaking, feeling like you may pass out and a frontal headache that fluctuates in intensity. She denies any other medical problems and is not on any daily medications except for a birth control shot.     Past Medical History:   Diagnosis Date    Epilepsy     Lorena Napoles at [facility]     History reviewed. No pertinent surgical history.  History reviewed. No pertinent family history.  Social History     Tobacco Use    Smoking status: Never     Passive exposure: Never    Smokeless tobacco: Never   Vaping Use    Vaping status: Never Used   Substance Use Topics    Alcohol use: Never    Drug use: Never     Medications Prior to Admission   Medication Sig Dispense Refill Last Dose/Taking    medroxyPROGESTERone (DEPO-PROVERA) 150 MG/ML injection Inject 1 mL into the appropriate muscle as directed by prescriber Every 3 (Three) Months.   10/24/2024     Allergies:  Patient has no known allergies.    Immunization History   Administered Date(s) Administered    COVID-19 (MODERNA) 1st,2nd,3rd Dose Monovalent 04/22/2021, 05/20/2021    COVID-19 (MODERNA) Monovalent Original Booster 11/27/2021           REVIEW OF SYSTEMS:    ROS    Vital Signs  Temp:  [98 °F (36.7 °C)-98.3 °F (36.8 °C)] 98.3 °F (36.8 °C)  Heart Rate:  [72-98] 83  Resp:  [20-21] 21  BP: (116-157)/() 123/83          Physical Exam:  Physical Exam    Emotional Behavior:    wnl   Debilities:   none  Results  Review:    I reviewed the patient's new clinical results.  Lab Results (most recent)       Procedure Component Value Units Date/Time    Comprehensive Metabolic Panel [[id number]]  (Abnormal) Collected: 11/24/24 1215    Specimen: Blood from Arm, Right Updated: 11/24/24 1258     Glucose 99 mg/dL      BUN 18 mg/dL      Creatinine 0.67 mg/dL      Sodium 140 mmol/L      Potassium 4.1 mmol/L      Chloride 101 mmol/L      CO2 25.1 mmol/L      Calcium 10.3 mg/dL      Total Protein 8.5 g/dL      Albumin 5.3 g/dL      ALT (SGPT) 16 U/L      AST (SGOT) 22 U/L      Alkaline Phosphatase 65 U/L      Total Bilirubin 0.5 mg/dL      Globulin 3.2 gm/dL      A/G Ratio 1.7 g/dL      BUN/Creatinine Ratio 26.9     Anion Gap 13.9 mmol/L      eGFR 126.9 mL/min/1.73     Narrative:      GFR Normal >60  Chronic Kidney Disease <60  Kidney Failure <15      TSH [[id number]]  (Normal) Collected: 11/24/24 1215    Specimen: Blood from Arm, Right Updated: 11/24/24 1258     TSH 1.470 uIU/mL     T4, Free [[id number]]  (Normal) Collected: 11/24/24 1215    Specimen: Blood from Arm, Right Updated: 11/24/24 1258     Free T4 1.35 ng/dL     hCG, Serum, Qualitative [[id number]]  (Normal) Collected: 11/24/24 1215    Specimen: Blood from Arm, Right Updated: 11/24/24 1241     HCG Qualitative Negative    CBC & Differential [[id number]]  (Normal) Collected: 11/24/24 1215    Specimen: Blood from Arm, Right Updated: 11/24/24 1224    Narrative:      The following orders were created for panel order CBC & Differential.  Procedure                               Abnormality         Status                     ---------                               -----------         ------                     CBC Auto Differential[[id number]]        Normal              Final result                 Please view results for these tests on the individual orders.    CBC Auto Differential [[id number]]  (Normal) Collected: 11/24/24 1215    Specimen: Blood from Arm, Right Updated: 11/24/24 1224      WBC 6.06 10*3/mm3      RBC 4.79 10*6/mm3      Hemoglobin 14.8 g/dL      Hematocrit 44.3 %      MCV 92.5 fL      MCH 30.9 pg      MCHC 33.4 g/dL      RDW 12.4 %      RDW-SD 42.1 fl      MPV 8.8 fL      Platelets 218 10*3/mm3      Neutrophil % 57.4 %      Lymphocyte % 32.7 %      Monocyte % 7.6 %      Eosinophil % 1.3 %      Basophil % 0.7 %      Immature Grans % 0.3 %      Neutrophils, Absolute 3.48 10*3/mm3      Lymphocytes, Absolute 1.98 10*3/mm3      Monocytes, Absolute 0.46 10*3/mm3      Eosinophils, Absolute 0.08 10*3/mm3      Basophils, Absolute 0.04 10*3/mm3      Immature Grans, Absolute 0.02 10*3/mm3      nRBC 0.0 /100 WBC     POC Lactate [[id number]]  (Normal) Collected: 11/24/24 1220    Specimen: Blood Updated: 11/24/24 1222     Lactate 0.7 mmol/L      Comment: Serial Number: [id number]:  [id number]               Imaging Results (Most Recent)       Procedure Component Value Units Date/Time    MRI Brain Without Contrast [[id number]] Collected: 11/24/24 1440     Updated: 11/24/24 1447    Narrative:      MRI BRAIN WO CONTRAST    Date of Exam: 11/24/2024 2:19 PM EST    Indication: altered speech.     Comparison: Head CT 11/24/2024    Technique:  Routine multiplanar/multisequence sequence images of the brain were obtained without contrast administration.      Findings:  Negative for recent infarct, acute intracranial hemorrhage, large mass lesion, midline shift or hydrocephalus. No suspicious susceptibility artifact. No extra-axial fluid collection. Major intracranial flow voids appear preserved. Normal posterior   pituitary T1 bright spot. Corpus callosum unremarkable. Negative for cerebellar tonsillar ectopia. Orbits symmetric. No obstructive sinus disease or layering fluid. No large mastoid effusion. Parenchymal volume and signal intensity appear normal for age.      Impression:      Impression:  Normal brain MRI.        Electronically Signed: Nilson Delgadillo MD    11/24/2024 2:45 PM EST    Workstation  ID: [id number]    CT Head Without Contrast [[id number]] Collected: 11/24/24 1228     Updated: 11/24/24 1232    Narrative:      CT HEAD WO CONTRAST    Date of Exam: 11/24/2024 12:15 PM EST    Indication: acute onset headache.    Comparison: Head CT 10/23/2008    Technique: Axial CT images were obtained of the head without contrast administration.  Coronal reconstructions were performed.  Automated exposure control and iterative reconstruction methods were used.      Findings:  No evidence of intracranial hemorrhage, mass, or midline shift.  Sulci and ventricles are symmetric.  Brain volume is appropriate for patient's age.  The gray white matter differentiation is intact. No focal hypodensities to suggest acute or subacute   infarct. The mastoid air cells and paranasal sinuses are well aerated.  Globes and extraocular muscles are normal.  No displaced or depressed skull fractures.  No suspicious lytic or sclerotic osseous lesions.      Impression:      No acute intracranial abnormality by head CT.      Electronically Signed: Nilson Delgadillo MD    11/24/2024 12:30 PM EST    Workstation ID: [id number]          reviewed    ECG/EMG Results (most recent)       None          reviewed            Microbiology Results (last 10 days)       ** No results found for the last 240 hours. **            Assessment & Plan     Speech abnormality     Speech abnormality   -History of juvenile epilepsy   -No longer taking epilepsy medication and cleared by neurology as teenager   -Cbc and CMP unremarkable  -Given Valium, benadryl, and Relgan in ED   -negative Hcg   -CT head: no acute intracranial abnormality   -MRI brain: unremarkable   -Migraine protocol   -Neurology consulted but patient stated she was feeling better         I discussed the patients findings and my recommendations with patient.     Discharge Diagnosis:      Speech abnormality      Hospital Course  Patient is a 23 y.o. female presented with speech disturbances. Patient was  having difficulty forming words and making complete sentences upon arrival to ED. Patient has history of juvenile seizures.  Patient states she was on medication for years but discontinued medication per neurology as a teenager.  Patient states this morning she has significant stress at work and started experiencing shaking and near syncope as well as bilateral frontal headache.  Patient states no new change in medications.  CBC and CMP unremarkable.  CT of head showed no acute intercranial abnormality.  MRI brain was unremarkable.  Patient given Valium Benadryl and Reglan with migraine protocol. Neurology consultation made but patient wanted to return home and follow-up outpatient. Patient to take medication as prescribed and follow-up with PCP in 1-2 weeks. Tests and recommendations given to patient and agrees with plan. If symptoms worsen, patient to call 911 or go to nearest ED.     Past Medical History:     Past Medical History:   Diagnosis Date    Epilepsy     Lorena Napoles at [facility]       Past Surgical History:   History reviewed. No pertinent surgical history.    Social History:   Social History     Socioeconomic History    Marital status: Single   Tobacco Use    Smoking status: Never     Passive exposure: Never    Smokeless tobacco: Never   Vaping Use    Vaping status: Never Used   Substance and Sexual Activity    Alcohol use: Never    Drug use: Never       Procedures Performed         Consults:   Consults       Date and Time Order Name Status Description    11/24/2024  4:58 PM Inpatient Neurology Consult General              Condition on Discharge:     Stable    Discharge Disposition  Home or Self Care    Discharge Medications     Discharge Medications        New Medications        Instructions Start Date   butalbital-acetaminophen-caffeine -40 MG per tablet  Commonly known as: FIORICET, ESGIC   1 tablet, Oral, Every 4 Hours PRN      hydrOXYzine 25 MG tablet  Commonly known as: ATARAX   25  mg, Oral, 3 Times Daily PRN      ondansetron 4 MG tablet  Commonly known as: Zofran   4 mg, Oral, Every 8 Hours PRN             Continue These Medications        Instructions Start Date   medroxyPROGESTERone 150 MG/ML injection  Commonly known as: DEPO-PROVERA   150 mg, Every 3 Months               Discharge Diet:     Activity at Discharge:   Activity Instructions       Activity as Tolerated      Activity as Tolerated      Work Restrictions      Type of Restriction: Work    May Return to Work: In 1 Week    With / Without Restrictions: Without Restrictions            Follow-up Appointments  Future Appointments   Date Time Provider Department Center   1/16/2025  3:15 PM NURSE/[facility]     Additional Instructions for the Follow-ups that You Need to Schedule       Ambulatory Referral to Neurology   As directed      Discharge Follow-up with PCP   As directed       Currently Documented PCP:    Kelley Elmore APRN    PCP Phone Number:    [phone]     Follow Up Details: 7-10 days        Discharge Follow-up with PCP   As directed       Currently Documented PCP:    Kelley Elmore APRN    PCP Phone Number:    [phone]     Follow Up Details: 7-10 days                Test Results Pending at Discharge  Pending Results       None             Risk for Readmission (LACE) Score: 2 (11/24/2024  7:00 PM)          NAIDA Javier  11/24/24  19:21 EST

## 2024-11-25 NOTE — PLAN OF CARE
Problem: Adult Inpatient Plan of Care  Goal: Patient-Specific Goal (Individualized)  Outcome: Met   Goal Outcome Evaluation:

## 2024-11-26 ENCOUNTER — APPOINTMENT (OUTPATIENT)
Dept: MRI IMAGING | Facility: HOSPITAL | Age: 24
End: 2024-11-26
Payer: COMMERCIAL

## 2024-11-26 ENCOUNTER — HOSPITAL ENCOUNTER (OUTPATIENT)
Facility: HOSPITAL | Age: 24
Setting detail: OBSERVATION
Discharge: HOME | End: 2024-11-28
Attending: EMERGENCY MEDICINE | Admitting: EMERGENCY MEDICINE
Payer: COMMERCIAL

## 2024-11-26 ENCOUNTER — OFFICE VISIT (OUTPATIENT)
Dept: FAMILY MEDICINE CLINIC | Facility: CLINIC | Age: 24
End: 2024-11-26
Payer: COMMERCIAL

## 2024-11-26 VITALS
HEART RATE: 86 BPM | OXYGEN SATURATION: 97 % | WEIGHT: 127 LBS | DIASTOLIC BLOOD PRESSURE: 81 MMHG | SYSTOLIC BLOOD PRESSURE: 143 MMHG | BODY MASS INDEX: 20.41 KG/M2 | HEIGHT: 66 IN

## 2024-11-26 DIAGNOSIS — R25.1 TREMOR: ICD-10-CM

## 2024-11-26 DIAGNOSIS — G40.A09 JUVENILE ABSENCE EPILEPSY: ICD-10-CM

## 2024-11-26 DIAGNOSIS — R47.9 SPEECH DISTURBANCE, UNSPECIFIED TYPE: Primary | ICD-10-CM

## 2024-11-26 LAB
AMPHET+METHAMPHET UR QL: NEGATIVE
AMPHETAMINES UR QL: NEGATIVE
ANION GAP SERPL CALCULATED.3IONS-SCNC: 9.4 MMOL/L (ref 5–15)
BARBITURATES UR QL SCN: POSITIVE
BASOPHILS # BLD AUTO: 0.06 10*3/MM3 (ref 0–0.2)
BASOPHILS NFR BLD AUTO: 0.9 % (ref 0–1.5)
BENZODIAZ UR QL SCN: POSITIVE
BUN SERPL-MCNC: 11 MG/DL (ref 6–20)
BUN/CREAT SERPL: 14.7 (ref 7–25)
BUPRENORPHINE SERPL-MCNC: NEGATIVE NG/ML
CALCIUM SPEC-SCNC: 9.6 MG/DL (ref 8.6–10.5)
CANNABINOIDS SERPL QL: NEGATIVE
CHLORIDE SERPL-SCNC: 105 MMOL/L (ref 98–107)
CO2 SERPL-SCNC: 25.6 MMOL/L (ref 22–29)
COCAINE UR QL: NEGATIVE
CREAT SERPL-MCNC: 0.75 MG/DL (ref 0.57–1)
DEPRECATED RDW RBC AUTO: 41.9 FL (ref 37–54)
EGFRCR SERPLBLD CKD-EPI 2021: 115.6 ML/MIN/1.73
EOSINOPHIL # BLD AUTO: 0.15 10*3/MM3 (ref 0–0.4)
EOSINOPHIL NFR BLD AUTO: 2.2 % (ref 0.3–6.2)
ERYTHROCYTE [DISTWIDTH] IN BLOOD BY AUTOMATED COUNT: 12.3 % (ref 12.3–15.4)
GLUCOSE SERPL-MCNC: 71 MG/DL (ref 65–99)
HCT VFR BLD AUTO: 43 % (ref 34–46.6)
HGB BLD-MCNC: 14.3 G/DL (ref 12–15.9)
IMM GRANULOCYTES # BLD AUTO: 0.02 10*3/MM3 (ref 0–0.05)
IMM GRANULOCYTES NFR BLD AUTO: 0.3 % (ref 0–0.5)
LYMPHOCYTES # BLD AUTO: 2.68 10*3/MM3 (ref 0.7–3.1)
LYMPHOCYTES NFR BLD AUTO: 40.1 % (ref 19.6–45.3)
MAGNESIUM SERPL-MCNC: 2.1 MG/DL (ref 1.6–2.6)
MCH RBC QN AUTO: 30.7 PG (ref 26.6–33)
MCHC RBC AUTO-ENTMCNC: 33.3 G/DL (ref 31.5–35.7)
MCV RBC AUTO: 92.3 FL (ref 79–97)
METHADONE UR QL SCN: NEGATIVE
MONOCYTES # BLD AUTO: 0.65 10*3/MM3 (ref 0.1–0.9)
MONOCYTES NFR BLD AUTO: 9.7 % (ref 5–12)
NEUTROPHILS NFR BLD AUTO: 3.13 10*3/MM3 (ref 1.7–7)
NEUTROPHILS NFR BLD AUTO: 46.8 % (ref 42.7–76)
NRBC BLD AUTO-RTO: 0 /100 WBC (ref 0–0.2)
OPIATES UR QL: NEGATIVE
OXYCODONE UR QL SCN: NEGATIVE
PCP UR QL SCN: NEGATIVE
PLATELET # BLD AUTO: 203 10*3/MM3 (ref 140–450)
PMV BLD AUTO: 8.9 FL (ref 6–12)
POTASSIUM SERPL-SCNC: 4.1 MMOL/L (ref 3.5–5.2)
RBC # BLD AUTO: 4.66 10*6/MM3 (ref 3.77–5.28)
SODIUM SERPL-SCNC: 140 MMOL/L (ref 136–145)
TRICYCLICS UR QL SCN: NEGATIVE
WBC NRBC COR # BLD AUTO: 6.69 10*3/MM3 (ref 3.4–10.8)

## 2024-11-26 PROCEDURE — G0378 HOSPITAL OBSERVATION PER HR: HCPCS

## 2024-11-26 PROCEDURE — 25010000002 GADOTERIDOL PER 1 ML: Performed by: EMERGENCY MEDICINE

## 2024-11-26 PROCEDURE — A9579 GAD-BASE MR CONTRAST NOS,1ML: HCPCS | Performed by: EMERGENCY MEDICINE

## 2024-11-26 PROCEDURE — 99213 OFFICE O/P EST LOW 20 MIN: CPT | Performed by: NURSE PRACTITIONER

## 2024-11-26 PROCEDURE — 85025 COMPLETE CBC W/AUTO DIFF WBC: CPT | Performed by: PHYSICIAN ASSISTANT

## 2024-11-26 PROCEDURE — 83735 ASSAY OF MAGNESIUM: CPT | Performed by: PHYSICIAN ASSISTANT

## 2024-11-26 PROCEDURE — 99285 EMERGENCY DEPT VISIT HI MDM: CPT

## 2024-11-26 PROCEDURE — 80048 BASIC METABOLIC PNL TOTAL CA: CPT | Performed by: PHYSICIAN ASSISTANT

## 2024-11-26 PROCEDURE — 80306 DRUG TEST PRSMV INSTRMNT: CPT | Performed by: PHYSICIAN ASSISTANT

## 2024-11-26 PROCEDURE — 70552 MRI BRAIN STEM W/DYE: CPT

## 2024-11-26 RX ORDER — SODIUM CHLORIDE 0.9 % (FLUSH) 0.9 %
10 SYRINGE (ML) INJECTION EVERY 12 HOURS SCHEDULED
Status: DISCONTINUED | OUTPATIENT
Start: 2024-11-26 | End: 2024-11-28 | Stop reason: HOSPADM

## 2024-11-26 RX ORDER — AMOXICILLIN 250 MG
2 CAPSULE ORAL 2 TIMES DAILY PRN
Status: DISCONTINUED | OUTPATIENT
Start: 2024-11-26 | End: 2024-11-28 | Stop reason: HOSPADM

## 2024-11-26 RX ORDER — ONDANSETRON 4 MG/1
4 TABLET, FILM COATED ORAL EVERY 8 HOURS PRN
COMMUNITY

## 2024-11-26 RX ORDER — BISACODYL 10 MG
10 SUPPOSITORY, RECTAL RECTAL DAILY PRN
Status: DISCONTINUED | OUTPATIENT
Start: 2024-11-26 | End: 2024-11-28 | Stop reason: HOSPADM

## 2024-11-26 RX ORDER — SODIUM CHLORIDE 9 MG/ML
40 INJECTION, SOLUTION INTRAVENOUS AS NEEDED
Status: DISCONTINUED | OUTPATIENT
Start: 2024-11-26 | End: 2024-11-28 | Stop reason: HOSPADM

## 2024-11-26 RX ORDER — SODIUM CHLORIDE 0.9 % (FLUSH) 0.9 %
10 SYRINGE (ML) INJECTION AS NEEDED
Status: DISCONTINUED | OUTPATIENT
Start: 2024-11-26 | End: 2024-11-28 | Stop reason: HOSPADM

## 2024-11-26 RX ORDER — POLYETHYLENE GLYCOL 3350 17 G/17G
17 POWDER, FOR SOLUTION ORAL DAILY PRN
Status: DISCONTINUED | OUTPATIENT
Start: 2024-11-26 | End: 2024-11-28 | Stop reason: HOSPADM

## 2024-11-26 RX ORDER — BISACODYL 5 MG/1
5 TABLET, DELAYED RELEASE ORAL DAILY PRN
Status: DISCONTINUED | OUTPATIENT
Start: 2024-11-26 | End: 2024-11-28 | Stop reason: HOSPADM

## 2024-11-26 RX ADMIN — GADOTERIDOL 15 ML: 279.3 INJECTION, SOLUTION INTRAVENOUS at 20:33

## 2024-11-26 RX ADMIN — Medication 10 ML: at 21:20

## 2024-11-26 NOTE — ED PROVIDER NOTES
Subjective   History of Present Illness  Patient is a 23-year-old female [facility] significant for seizures presents to the ED with continued speech abnormality and tick patient states she was actually seen in our ED 2 days ago with all the same symptoms she states at that time she also had a headache but does not have one anymore.  States her symptoms have not gotten any worse or better she was told to follow-up with neurology on an outpatient basis as she left prior to inpatient neurology consult however, when they followed up with her PCP she told them that it would take months for her to get into neurology and that she should go back to the ER for further evaluation. patient has no new complaints.  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          Review of Systems   Constitutional: Negative.    Respiratory: Negative.     Cardiovascular: Negative.    Gastrointestinal:  Negative for abdominal distention, abdominal pain, nausea and vomiting.   Genitourinary:  Negative for difficulty urinating, dysuria, flank pain, frequency and hematuria.   Neurological:  Positive for tremors and speech difficulty. Negative for dizziness, seizures, syncope, facial asymmetry, light-headedness, numbness and headaches.       Past Medical History:   Diagnosis Date    Epilepsy     Lorena Napoles at [facility]       No Known Allergies    No past surgical history on file.    No family history on file.    Social History     Socioeconomic History    Marital status: Single   Tobacco Use    Smoking status: Never     Passive exposure: Never    Smokeless tobacco: Never   Vaping Use    Vaping status: Never Used   Substance and Sexual Activity    Alcohol use: Never    Drug use: Never           Objective   Physical Exam  Vitals and nursing note reviewed.   Constitutional:       General: She is not in  acute distress.     Appearance: Normal appearance. She is well-developed. She is not ill-appearing, toxic-appearing or diaphoretic.   HENT:      Head: Normocephalic and atraumatic.      Mouth/Throat:      Mouth: Mucous membranes are moist.      Pharynx: Oropharynx is clear.   Eyes:      General: No scleral icterus.     Extraocular Movements: Extraocular movements intact.      Pupils: Pupils are equal, round, and reactive to light.   Cardiovascular:      Rate and Rhythm: Normal rate and regular rhythm.      Pulses: Normal pulses.      Heart sounds: No murmur heard.     No friction rub. No gallop.   Pulmonary:      Effort: Pulmonary effort is normal. No respiratory distress.      Breath sounds: Normal breath sounds. No stridor. No wheezing, rhonchi or rales.   Chest:      Chest wall: No tenderness.   Abdominal:      General: Bowel sounds are normal.      Palpations: Abdomen is soft.      Tenderness: There is no abdominal tenderness. There is no guarding or rebound.   Skin:     General: Skin is warm.      Capillary Refill: Capillary refill takes less than 2 seconds.      Coloration: Skin is not cyanotic, jaundiced or pale.      Findings: No rash.   Neurological:      Mental Status: She is alert and oriented to person, place, and time.      Comments: Moving all extremities freely.   strength equal bilaterally.  Sensation intact throughout.  Tremor noted with intention mainly in upper extremities.  Halting speech   Psychiatric:         Mood and Affect: Mood normal.         Behavior: Behavior normal.         Procedures           ED Course  ED Course as of 11/26/24 1952   Tue Nov 26, 2024   1736 Spoke to Dr. Ramírez on-call for neurology who also reviewed patient's chart will do brain MRI with contrast this evening EEG will be ordered for in the morning.  Patient be placed observation unit for neurology consult in the a.m. [AA]   1909 Benzodiazepine Screen, Urine(!): Positive [AA]   1910 Barbiturates Screen, Urine(!):  "Positive [AA]      ED Course User Index  [AA] Daya Johnston PA    /77   Pulse 108   Temp 98 °F (36.7 °C)   Resp 16   Ht 167.6 cm (66\")   Wt 57.4 kg (126 lb 8.7 oz)   SpO2 98%   BMI 20.42 kg/m²   Medications   sodium chloride 0.9 % flush 10 mL (has no administration in time range)   sodium chloride 0.9 % flush 10 mL (has no administration in time range)   sodium chloride 0.9 % flush 10 mL (has no administration in time range)   sodium chloride 0.9 % infusion 40 mL (has no administration in time range)   sennosides-docusate (PERICOLACE) 8.6-50 MG per tablet 2 tablet (has no administration in time range)     And   polyethylene glycol (MIRALAX) packet 17 g (has no administration in time range)     And   bisacodyl (DULCOLAX) EC tablet 5 mg (has no administration in time range)     And   bisacodyl (DULCOLAX) suppository 10 mg (has no administration in time range)     Labs Reviewed   URINE DRUG SCREEN - Abnormal; Notable for the following components:       Result Value    Benzodiazepine Screen, Urine Positive (*)     Barbiturates Screen, Urine Positive (*)     All other components within normal limits    Narrative:     Cutoff For Drugs Screened:    Amphetamines               500 ng/ml  Barbiturates               200 ng/ml  Benzodiazepines            150 ng/ml  Cocaine                    150 ng/ml  Methadone                  200 ng/ml  Opiates                    100 ng/ml  Phencyclidine               25 ng/ml  THC                         50 ng/ml  Methamphetamine            500 ng/ml  Tricyclic Antidepressants  300 ng/ml  Oxycodone                  100 ng/ml  Buprenorphine               10 ng/ml    The normal value for all drugs tested is negative. This report includes unconfirmed screening results, with the cutoff values listed, to be used for medical treatment purposes only.  Unconfirmed results must not be used for non-medical purposes such as employment or legal testing.  Clinical consideration " should be applied to any drug of abuse test, particularly when unconfirmed results are used.    All urine drugs of abuse requests without chain of custody are for medical screening purposes only.  False positives are possible.     BASIC METABOLIC PANEL - Normal    Narrative:     GFR Normal >60  Chronic Kidney Disease <60  Kidney Failure <15     MAGNESIUM - Normal   CBC WITH AUTO DIFFERENTIAL - Normal   CBC AND DIFFERENTIAL    Narrative:     The following orders were created for panel order CBC & Differential.  Procedure                               Abnormality         Status                     ---------                               -----------         ------                     CBC Auto Differential[[id number]]        Normal              Final result                 Please view results for these tests on the individual orders.     MRI Brain With Contrast    (Results Pending)                                                        Medical Decision Making  Chart Review: Discharge summary reviewed from 11/24/2024 was admitted for similar complaint had fairly unremarkable workup including normal CT head, normal MRI brain without contrast, labs are fairly unremarkable no signs of infection or electrolyte abnormality.  Left prior to neurology consult.  -Chart review patient had normal EEG on 7/23/2021.    Labs: CBC showed no leukocytosis or anemia.  Metabolic panel unremarkable.  CBC are normal.  Urine drug screen positive for benzodiazepines and barbiturates  Radiology: MRI ordered pending upon admission      Disposition/Treatment:  Appropriate PPE was worn during exam and throughout all encounters with the patient.  Patient presented to the ED with continued speech disturbance and tremor she was seen in our ED 2 days ago with the same complaint she was initially placed in observation unit but left prior to neurology consultation.  Patient mother at bedside states they called her PCPs office who showed up to return to  the ED for further neuroconsult as it would take months on an outpatient basis.  Patient has no new complaints today.  She denies any new stressors since being discharged.  Continues to unlikely be CVA.  Did speak with Dr. Ramírez on-call for neurology recommended MRI of brain with contrast and an EEG in the morning.  Patient will be placed back in observation unit for neurology consult.  Patient and family were in agreement with plan.  Will repeat CBC, metabolic panel, add on magnesium and will check a urine drug screen though I do not think there will be significant findings these are all pending at time of placement observation unit but will follow    Problems Addressed:  Speech disturbance, unspecified type: complicated acute illness or injury  Tremor: complicated acute illness or injury    Amount and/or Complexity of Data Reviewed  Labs: ordered. Decision-making details documented in ED Course.  Radiology: ordered.    Risk  OTC drugs.  Prescription drug management.  Decision regarding hospitalization.        Final diagnoses:   Speech disturbance, unspecified type   Tremor       ED Disposition  ED Disposition       ED Disposition   Decision to Admit    Condition   --    Comment   --               No follow-up provider specified.       Medication List      No changes were made to your prescriptions during this visit.            Daya Johnston PA  11/26/24 1952

## 2024-11-26 NOTE — LETTER
November 28, 2024     Patient: Jing Vazquez   YOB: 2000   Date of Visit: 11/26/2024       To Whom It May Concern:    It is my medical opinion that Jing Vazquez should remain out of work until she has been cleared by neurology pending outpatient clinic follow-up appointment .           Sincerely,        Robert Sequeira PA-C

## 2024-11-27 ENCOUNTER — APPOINTMENT (OUTPATIENT)
Dept: NEUROLOGY | Facility: HOSPITAL | Age: 24
End: 2024-11-27
Payer: COMMERCIAL

## 2024-11-27 LAB
ANION GAP SERPL CALCULATED.3IONS-SCNC: 9.4 MMOL/L (ref 5–15)
BASOPHILS # BLD AUTO: 0.04 10*3/MM3 (ref 0–0.2)
BASOPHILS NFR BLD AUTO: 0.6 % (ref 0–1.5)
BUN SERPL-MCNC: 15 MG/DL (ref 6–20)
BUN/CREAT SERPL: 26.3 (ref 7–25)
CALCIUM SPEC-SCNC: 9.4 MG/DL (ref 8.6–10.5)
CHLORIDE SERPL-SCNC: 106 MMOL/L (ref 98–107)
CO2 SERPL-SCNC: 21.6 MMOL/L (ref 22–29)
CREAT SERPL-MCNC: 0.57 MG/DL (ref 0.57–1)
DEPRECATED RDW RBC AUTO: 41.5 FL (ref 37–54)
EGFRCR SERPLBLD CKD-EPI 2021: 132 ML/MIN/1.73
EOSINOPHIL # BLD AUTO: 0.19 10*3/MM3 (ref 0–0.4)
EOSINOPHIL NFR BLD AUTO: 2.9 % (ref 0.3–6.2)
ERYTHROCYTE [DISTWIDTH] IN BLOOD BY AUTOMATED COUNT: 12.4 % (ref 12.3–15.4)
GLUCOSE SERPL-MCNC: 93 MG/DL (ref 65–99)
HCT VFR BLD AUTO: 41.3 % (ref 34–46.6)
HGB BLD-MCNC: 13.4 G/DL (ref 12–15.9)
IMM GRANULOCYTES # BLD AUTO: 0.02 10*3/MM3 (ref 0–0.05)
IMM GRANULOCYTES NFR BLD AUTO: 0.3 % (ref 0–0.5)
LYMPHOCYTES # BLD AUTO: 2.59 10*3/MM3 (ref 0.7–3.1)
LYMPHOCYTES NFR BLD AUTO: 39.6 % (ref 19.6–45.3)
MCH RBC QN AUTO: 29.6 PG (ref 26.6–33)
MCHC RBC AUTO-ENTMCNC: 32.4 G/DL (ref 31.5–35.7)
MCV RBC AUTO: 91.4 FL (ref 79–97)
MONOCYTES # BLD AUTO: 0.53 10*3/MM3 (ref 0.1–0.9)
MONOCYTES NFR BLD AUTO: 8.1 % (ref 5–12)
NEUTROPHILS NFR BLD AUTO: 3.17 10*3/MM3 (ref 1.7–7)
NEUTROPHILS NFR BLD AUTO: 48.5 % (ref 42.7–76)
NRBC BLD AUTO-RTO: 0 /100 WBC (ref 0–0.2)
PLATELET # BLD AUTO: 218 10*3/MM3 (ref 140–450)
PMV BLD AUTO: 8.7 FL (ref 6–12)
POTASSIUM SERPL-SCNC: 4 MMOL/L (ref 3.5–5.2)
RBC # BLD AUTO: 4.52 10*6/MM3 (ref 3.77–5.28)
SODIUM SERPL-SCNC: 137 MMOL/L (ref 136–145)
WBC NRBC COR # BLD AUTO: 6.54 10*3/MM3 (ref 3.4–10.8)

## 2024-11-27 PROCEDURE — 99214 OFFICE O/P EST MOD 30 MIN: CPT | Performed by: STUDENT IN AN ORGANIZED HEALTH CARE EDUCATION/TRAINING PROGRAM

## 2024-11-27 PROCEDURE — 25010000002 LEVETRIRACETAM PER 10 MG: Performed by: STUDENT IN AN ORGANIZED HEALTH CARE EDUCATION/TRAINING PROGRAM

## 2024-11-27 PROCEDURE — 95819 EEG AWAKE AND ASLEEP: CPT | Performed by: PSYCHIATRY & NEUROLOGY

## 2024-11-27 PROCEDURE — G0378 HOSPITAL OBSERVATION PER HR: HCPCS

## 2024-11-27 PROCEDURE — 80048 BASIC METABOLIC PNL TOTAL CA: CPT | Performed by: EMERGENCY MEDICINE

## 2024-11-27 PROCEDURE — 85025 COMPLETE CBC W/AUTO DIFF WBC: CPT | Performed by: EMERGENCY MEDICINE

## 2024-11-27 PROCEDURE — 95816 EEG AWAKE AND DROWSY: CPT

## 2024-11-27 PROCEDURE — 96374 THER/PROPH/DIAG INJ IV PUSH: CPT

## 2024-11-27 RX ORDER — LEVETIRACETAM 500 MG/1
500 TABLET ORAL 2 TIMES DAILY
Status: DISCONTINUED | OUTPATIENT
Start: 2024-11-28 | End: 2024-11-28 | Stop reason: HOSPADM

## 2024-11-27 RX ORDER — BUTALBITAL, ACETAMINOPHEN AND CAFFEINE 50; 325; 40 MG/1; MG/1; MG/1
1 TABLET ORAL EVERY 4 HOURS PRN
Status: DISCONTINUED | OUTPATIENT
Start: 2024-11-27 | End: 2024-11-28 | Stop reason: HOSPADM

## 2024-11-27 RX ORDER — LEVETIRACETAM 500 MG/5ML
1500 INJECTION, SOLUTION, CONCENTRATE INTRAVENOUS ONCE
Status: COMPLETED | OUTPATIENT
Start: 2024-11-27 | End: 2024-11-27

## 2024-11-27 RX ORDER — HYDROXYZINE HYDROCHLORIDE 25 MG/1
25 TABLET, FILM COATED ORAL 3 TIMES DAILY PRN
Status: DISCONTINUED | OUTPATIENT
Start: 2024-11-27 | End: 2024-11-28 | Stop reason: HOSPADM

## 2024-11-27 RX ADMIN — Medication 10 ML: at 20:09

## 2024-11-27 RX ADMIN — LEVETIRACETAM 1500 MG: 100 INJECTION INTRAVENOUS at 20:09

## 2024-11-27 NOTE — CASE MANAGEMENT/SOCIAL WORK
Discharge Planning Assessment   [place]     Patient Name: Jing Vazquez  MRN: [id number]  Today's Date: 11/27/2024    Admit Date: 11/26/2024    Plan: Routine home w/ grandparents   Discharge Needs Assessment       Row Name 11/27/24 1059       Living Environment    People in Home grandparent(s)    Name(s) of People in Home lives with grandparents- Rosemarie (grandma)    Current Living Arrangements home    Potentially Unsafe Housing Conditions none    In the past 12 months has the electric, gas, oil, or water company threatened to shut off services in your home? No    Primary Care Provided by self    Provides Primary Care For no one    Family Caregiver if Needed grandparent(s)    Family Caregiver Names grandmother Rosemarie    Quality of Family Relationships helpful;involved;supportive    Able to Return to Prior Arrangements yes       Resource/Environmental Concerns    Resource/Environmental Concerns none    Transportation Concerns none       Transportation Needs    In the past 12 months, has lack of transportation kept you from medical appointments or from getting medications? no    In the past 12 months, has lack of transportation kept you from meetings, work, or from getting things needed for daily living? No       Food Insecurity    Within the past 12 months, you worried that your food would run out before you got the money to buy more. Never true    Within the past 12 months, the food you bought just didn't last and you didn't have money to get more. Never true       Transition Planning    Patient/Family Anticipates Transition to home with family    Patient/Family Anticipated Services at Transition none    Transportation Anticipated family or friend will provide       Discharge Needs Assessment    Readmission Within the Last 30 Days no previous admission in last 30 days    Equipment Currently Used at Home none    Concerns to be Addressed denies needs/concerns at this time    Anticipated Changes Related to  Illness none    Equipment Needed After Discharge none                   Discharge Plan       Row Name 11/27/24 1102       Plan    Plan Routine home w/ grandparents    Plan Comments CM met with patient at the bedside. Confirmed PCP, insurance, and pharmacy. Declined M2B. Patient denies any difficulty affording medications. Patient is not current with any HHC/OPPT/OT services. Patient lives at home with her grandparents, is independent with ADLS/IADLS, and does not drive. Grandparents able to provide DC transport. DC Barriers: Neurology following, EEG pending.                  Continued Care and Services - Admitted Since 11/26/2024    No active coordination exists for this encounter.       Expected Discharge Date and Time       Expected Discharge Date Expected Discharge Time    Nov 28, 2024            Demographic Summary       Row Name 11/27/24 1059       General Information    Admission Type observation    Arrived From emergency department    Referral Source admission list    Reason for Consult discharge planning    Preferred Language English       Contact Information    Permission Granted to Share Info With     Contact Information Obtained for                    Functional Status       Row Name 11/27/24 1059       Functional Status    Usual Activity Tolerance good    Current Activity Tolerance good       Functional Status, IADL    Medications independent    Meal Preparation independent    Housekeeping independent    Laundry independent    Shopping independent           Paulo Yin RN     Cell number [phone]  Office number [phone]

## 2024-11-27 NOTE — PROCEDURES
This is an inpatient,   Digitally recorded multi-montage EEG with leads placed according to the international 10/20 system  Photic stimulation was not done  Hyperventilation was not done    With the patient     Mostly asleep with spindles  No asymmetry    Nothing suggesting clear-cut epileptiform activity or asymmetry  No clinical events      Impression:    This is an  adult, awake/drowsy/asleep EEG which showed diffuse slowing which is seen in patients with medication effect, deep sleep or moderate to severe encephalopathy    Nonspecific  No seizures but EEG like this does not rule out epilepsyThis is an inpatient,   Digitally recorded multi-montage EEG with leads placed according to the international 10/20 system  Photic stimulation was done  Hyperventilation was  done  With the patient fully aroused background was 10-11 hz posterior alpha rhythm  The patient became drowsy and stage II sleep was seen    Mostly asleep with spindles  No asymmetry    There were significant tremors type movements and sometimes during them it is difficult to see any interictal type changes  Nothing suggesting clear-cut epileptiform activity or asymmetry  No clinical events  HV did no show any significant changes  Photic showed symmetric driving    Impression:    This is otherwise normal awake, drowsy and asleep EEG with motion artifact    Nonspecific  No seizures but EEG like this does not rule out epilepsy

## 2024-11-27 NOTE — H&P
[facility]    Patient Name: Jing Vazquez  : 2000  MRN: [id number]  Primary Care Physician: Kelley Elmore APRN  Date of admission: 2024     Patient Care Team:  Kelley Elmore APRN as PCP - General (Nurse Practitioner)          Subjective   History Present Illness     Chief Complaint:   Chief Complaint   Patient presents with   • speech issues         History of Present Illness  Obtained from ED provider HPI on 2024:  Patient is a 23-year-old female [facility] significant for seizures presents to the ED with continued speech abnormality and tick patient states she was actually seen in our ED 2 days ago with all the same symptoms she states at that time she also had a headache but does not have one anymore.  States her symptoms have not gotten any worse or better she was told to follow-up with neurology on an outpatient basis as she left prior to inpatient neurology consult however, when they followed up with her PCP she told them that it would take months for her to get into neurology and that she should go back to the ER for further evaluation. patient has no new complaints.  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    24:  Patient confirms the HPI noted above including recent admission for similar symptoms that seem somewhat improved at the time of discharge however have subsequently worsened.  She notes that she has a significant stuttering speech pattern which has been present for the past 3-4 days with tremor in her left upper extremity while speaking.  She denies any falls or recent trauma and confirms compliance with outpatient medical therapies.  Patient notes that she was previously on seizure medications which she had been on since she was a child but that these were slowly tapered down and have subsequently been  discontinued for some time.  She has had some headaches recently as well.  Patient denies any dyspnea, chest pain, recent cough or fever, neck or back pain.  She does not smoke and drinks alcohol rarely        Review of Systems   Constitutional: Negative.   Eyes: Negative.    Cardiovascular: Negative.    Respiratory: Negative.     Skin: Negative.    Musculoskeletal: Negative.    Gastrointestinal: Negative.    Genitourinary: Negative.    Neurological:  Positive for headaches and tremors.        Speech disturbance   Psychiatric/Behavioral: Negative.             Personal History     Past Medical History:   Past Medical History:   Diagnosis Date   • Epilepsy     Lorena Napoles at [facility]       Surgical History:    History reviewed. No pertinent surgical history.        Family History: family history includes Bipolar disorder in her father; Schizophrenia in her brother. Otherwise pertinent FHx was reviewed and unremarkable.     Social History:  reports that she has never smoked. She has never been exposed to tobacco smoke. She has never used smokeless tobacco. She reports current alcohol use of about 2.0 standard drinks of alcohol per week. She reports current drug use.      Medications:  Prior to Admission medications    Medication Sig Start Date End Date Taking? Authorizing Provider   butalbital-acetaminophen-caffeine (FIORICET, ESGIC) -40 MG per tablet Take 1 tablet by mouth Every 4 (Four) Hours As Needed for Headache. 11/24/24   Viviana Johnston APRN   hydrOXYzine (ATARAX) 25 MG tablet Take 1 tablet by mouth 3 (Three) Times a Day As Needed for Anxiety. 11/24/24   Viviana Johnston APRN   medroxyPROGESTERone (DEPO-PROVERA) 150 MG/ML injection Inject 1 mL into the appropriate muscle as directed by prescriber Every 3 (Three) Months.    Juli Titus MD   ondansetron (ZOFRAN) 4 MG tablet Take 1 tablet by mouth Every 8 (Eight) Hours As Needed for Nausea or Vomiting.    Juli Titus MD        Allergies:  No Known Allergies    Objective   Objective     Vital Signs  Temp:  [97.9 °F (36.6 °C)-98.4 °F (36.9 °C)] 98 °F (36.7 °C)  Heart Rate:  [] 86  Resp:  [16-18] 16  BP: (112-140)/(66-89) 133/79  SpO2:  [97 %-100 %] 98 %  on   ;   Device (Oxygen Therapy): room air  Body mass index is 20.42 kg/m².    Physical Exam  Vitals reviewed.   Constitutional:       General: She is not in acute distress.     Appearance: Normal appearance. She is normal weight. She is not ill-appearing, toxic-appearing or diaphoretic.   HENT:      Head: Normocephalic.      Right Ear: External ear normal.      Left Ear: External ear normal.      Nose: Nose normal.      Mouth/Throat:      Mouth: Mucous membranes are moist.   Eyes:      Extraocular Movements: Extraocular movements intact.   Cardiovascular:      Rate and Rhythm: Normal rate and regular rhythm.      Pulses: Normal pulses.   Pulmonary:      Effort: Pulmonary effort is normal.      Breath sounds: Normal breath sounds.   Abdominal:      General: Bowel sounds are normal.      Palpations: Abdomen is soft.   Musculoskeletal:         General: Normal range of motion.      Cervical back: Normal range of motion.      Right lower leg: No edema.      Left lower leg: No edema.   Skin:     General: Skin is warm and dry.      Capillary Refill: Capillary refill takes less than 2 seconds.   Neurological:      General: No focal deficit present.      Mental Status: She is alert.   Psychiatric:         Mood and Affect: Mood normal.         Behavior: Behavior normal.         Thought Content: Thought content normal.         Judgment: Judgment normal.         Results Review:  I have personally reviewed most recent cardiac tracings, lab results, and radiology images and interpretations and agree with findings, most notably: BMP, CBC, UA, MRI.    Results from last 7 days   Lab Units 11/27/24  0308   WBC 10*3/mm3 6.54   HEMOGLOBIN g/dL 13.4   HEMATOCRIT % 41.3   PLATELETS 10*3/mm3 218      Results from last 7 days   Lab Units 11/27/24  0308 11/26/24  1750 11/24/24  1220 11/24/24  1215   SODIUM mmol/L 137   < >  --  140   POTASSIUM mmol/L 4.0   < >  --  4.1   CHLORIDE mmol/L 106   < >  --  101   CO2 mmol/L 21.6*   < >  --  25.1   BUN mg/dL 15   < >  --  18   CREATININE mg/dL 0.57   < >  --  0.67   GLUCOSE mg/dL 93   < >  --  99   CALCIUM mg/dL 9.4   < >  --  10.3   ALK PHOS U/L  --   --   --  65   ALT (SGPT) U/L  --   --   --  16   AST (SGOT) U/L  --   --   --  22   LACTATE mmol/L  --   --  0.7  --     < > = values in this interval not displayed.     Estimated Creatinine Clearance: 140.3 mL/min (by C-G formula based on SCr of 0.57 mg/dL).  Brief Urine Lab Results  (Last result in the past 365 days)        Color   Clarity   Blood   Leuk Est   Nitrite   Protein   CREAT   Urine HCG        07/18/24 1429               Negative               Microbiology Results (last 10 days)       ** No results found for the last 240 hours. **            ECG/EMG Results (most recent)       None                    MRI Brain With Contrast    Result Date: 11/26/2024  Impression: No acute intracranial pathology. Electronically Signed: Trenton Garcia MD  11/26/2024 8:41 PM EST  Workstation ID: [id number]    MRI Brain Without Contrast    Result Date: 11/24/2024  Impression: Normal brain MRI. Electronically Signed: Nilson Delgadillo MD  11/24/2024 2:45 PM EST  Workstation ID: [id number]    CT Head Without Contrast    Result Date: 11/24/2024  No acute intracranial abnormality by head CT. Electronically Signed: Nilson Delgadillo MD  11/24/2024 12:30 PM EST  Workstation ID: [id number]       Estimated Creatinine Clearance: 140.3 mL/min (by C-G formula based on SCr of 0.57 mg/dL).    Assessment & Plan   Assessment/Plan       Active Hospital Problems    Diagnosis  POA   • **Speech disturbance [R47.9]  Yes      Resolved Hospital Problems   No resolved problems to display.       Speech disturbance with tremor  -CBC and BMP generally  unremarkable  -UA positive for benzodiazepines and barbiturates (patient has been given these medications recently in the hospital  -MRI of brain ordered in the ED showed no acute intracranial process  -Neurology consulted, EEG ordered  -Maintain seizure precautions          VTE Prophylaxis - Active VTE Prophylaxis  Mechanical:        Start        11/26/24 1748  Maintain Sequential Compression Device  Continuous                          Select Pharmacologic VTE Prophylaxis if Desired & Appropriate      CODE STATUS:    Code Status and Medical Interventions: CPR (Attempt to Resuscitate); Full Support   Ordered at: 11/26/24 1748     Code Status (Patient has no pulse and is not breathing):    CPR (Attempt to Resuscitate)     Medical Interventions (Patient has pulse or is breathing):    Full Support       This patient has been examined wearing personal protective equipment.     I discussed the patient's findings and my recommendations with patient and nursing staff.      Signature:Electronically signed by Shaun Sequeira PA-C, 11/27/24, 4:42 PM EST.

## 2024-11-27 NOTE — CONSULTS
"Primary Care Provider: No ref. provider found     Consult requested by: ED    Reason for Consultation: Neurological evaluation-speech abnormality and tics    History taken from: patient chart RN    Chief complaint: Speech abnormality       SUBJECTIVE:    History of present illness: Background per H&P: Jing Vazquez is a 23 y.o. 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  She had KIERA and was taken off ASM until 19 years old. Last EEG 2020 in [place] where her neurologist told her he saw \"3 alvarado, but no seizures\". She believes her last seizure was in Middle School and were described as \"staring episodes\" thought to be Absence seizures.     Patient was initially seen 11/24/2025 at [facility] for difficulty forming words and making complete sentences after experiencing significant stress at work where she began experiencing whole body shaking and a near syncopal episode and a bifrontal headache.  She has had no changes in her medication at that time MRI brain at the time was negative.  She had received Valium, Benadryl, and Reglan and this had helped.  At the time neurology consult was made with the patient did not want a see neurology and wanted to go home and follow-up outpatient.  Patient did not see a neurologist in the outpatient setting.  She is advised to follow-up with her PCP in 1 to 2 weeks.  She followed with her PCP 11/26/2024 and was advised to go to back to the emergency room due to not being able to see a neurologist on outpatient basis for several weeks.  The patient was admitted to observation unit for neuro evaluation.    Initially she was working and started feeling nausea and shaking. She felt she was sturggling to understand. Felt she had a pressure feeling throughout her head that was intermittent. She did not go to the ED until a few hours later and " "started noticing stuttering. No incontinence or tongue biting. Mainly affecting her left face and LUE that twitches almost like a myoclonus during our interaction. Symptoms have persisted and slightly worsened since Sunday.     Grandfather stated she had a nosebleed and was under a lot of stress. She normally gets whole body weakness when she is under stress and feels somnolent.     NIHSS 0  MAEW      HPI per ED notes 11/26/2024:  \"Patient is a 23-year-old female PMH significant for seizures presents to the ED with continued speech abnormality and tick patient states she was actually seen in our ED 2 days ago with all the same symptoms she states at that time she also had a headache but does not have one anymore.  States her symptoms have not gotten any worse or better she was told to follow-up with neurology on an outpatient basis as she left prior to inpatient neurology consult however, when they followed up with her PCP she told them that it would take months for her to get into neurology and that she should go back to the ER for further evaluation. patient has no new complaints.  In regards to her history of seizures she was diagnosed with febrile seizures as a child and was on medication until about 4 years ago and it was discontinued after normal brainwave tests.\"    MRI brain with contrast unremarkable.        - Portions of the above HPI were copied from previous encounters and edited as appropriate. PMH as detailed below.     Review of Systems   Constitutional:  Negative for fatigue and fever.   HENT:  Negative for ear discharge, ear pain, tinnitus, trouble swallowing and voice change.    Eyes:  Negative for photophobia, pain and visual disturbance.   Respiratory:  Negative for chest tightness and shortness of breath.    Cardiovascular:  Negative for chest pain.   Gastrointestinal:  Negative for nausea and vomiting.   Musculoskeletal:  Negative for back pain, neck pain and neck stiffness.   Neurological:  " Positive for tremors and speech difficulty. Negative for dizziness, seizures, syncope, facial asymmetry, weakness, light-headedness, numbness and headaches.   All other systems reviewed and are negative.         PATIENT HISTORY:  Past Medical History:   Diagnosis Date    Epilepsy     Lorena Napoles at [facility]   , History reviewed. No pertinent surgical history.,   Family History   Problem Relation Age of Onset    Bipolar disorder Father     Schizophrenia Brother    ,   Social History     Tobacco Use    Smoking status: Never     Passive exposure: Never    Smokeless tobacco: Never   Vaping Use    Vaping status: Never Used   Substance Use Topics    Alcohol use: Yes     Alcohol/week: 2.0 standard drinks of alcohol     Types: 1 Glasses of wine, 1 Shots of liquor per week     Comment: OCC DRINKER 1-2 DRINKS AT A TIME EITHER WINE OR COCKTAILS    Drug use: Yes     Comment: THC EDIABLES   ,   Prior to Admission medications    Medication Sig Start Date End Date Taking? Authorizing Provider   butalbital-acetaminophen-caffeine (FIORICET, ESGIC) -40 MG per tablet Take 1 tablet by mouth Every 4 (Four) Hours As Needed for Headache. 11/24/24   Viviana Johnston APRN   hydrOXYzine (ATARAX) 25 MG tablet Take 1 tablet by mouth 3 (Three) Times a Day As Needed for Anxiety. 11/24/24   Viviana Johnston APRN   medroxyPROGESTERone (DEPO-PROVERA) 150 MG/ML injection Inject 1 mL into the appropriate muscle as directed by prescriber Every 3 (Three) Months.    Provider, MD Juli   ondansetron (ZOFRAN) 4 MG tablet Take 1 tablet by mouth Every 8 (Eight) Hours As Needed for Nausea or Vomiting.    Provider, MD Juli    Allergies:  Patient has no known allergies.    Current Facility-Administered Medications   Medication Dose Route Frequency Provider Last Rate Last Admin    sennosides-docusate (PERICOLACE) 8.6-50 MG per tablet 2 tablet  2 tablet Oral BID PRN Daya Johnston PA        And    polyethylene glycol (MIRALAX)  packet 17 g  17 g Oral Daily PRN Daya Johnston PA        And    bisacodyl (DULCOLAX) EC tablet 5 mg  5 mg Oral Daily PRN Daya Johnston PA        And    bisacodyl (DULCOLAX) suppository 10 mg  10 mg Rectal Daily PRN Daya Johnston PA        butalbital-acetaminophen-caffeine (FIORICET, ESGIC) -40 MG per tablet 1 tablet  1 tablet Oral Q4H PRN Shaun Sequeira PA-PADMAJA        hydrOXYzine (ATARAX) tablet 25 mg  25 mg Oral TID PRN Shaun Sequeira PA-PADMAJA        sodium chloride 0.9 % flush 10 mL  10 mL Intravenous PRN Daya Johnston PA        sodium chloride 0.9 % flush 10 mL  10 mL Intravenous Q12H Daya Johnston PA   10 mL at 11/26/24 2120    sodium chloride 0.9 % flush 10 mL  10 mL Intravenous PRN Daya Johnston PA        sodium chloride 0.9 % infusion 40 mL  40 mL Intravenous PRN Daya Johnston PA            ________________________________________________________        OBJECTIVE:  Upon today's exam,    GEN: NAD, pleasant, cooperative  CHEST: No signs of resp distress, on room air    NEURO  MENTAL STATUS: AAOx3, memory intact, fund of knowledge appropriate  LANG/SPEECH: Naming and repetition intact, fluent, follows 3-step commands    CRANIAL NERVES:  II-XII grossly intact    MOTOR:  Motor strength 5/5 throughout, symmetric.     REFLEXES: 2/4 throughout    SENSORY:  Normal to touch, temp all limbs  No hemineglect, no extinction to double-sided stimulation (visual & tactile)  COORD: Normal finger to nose              ________________________________________________________   RESULTS REVIEW:    VITAL SIGNS:   Temp:  [97.9 °F (36.6 °C)-98.2 °F (36.8 °C)] 98.1 °F (36.7 °C)  Heart Rate:  [] 79  Resp:  [16-18] 16  BP: (112-143)/(66-89) 115/80     LABS:      Lab 11/27/24  0308 11/26/24  1750 11/24/24  1220 11/24/24  1215   WBC 6.54 6.69  --  6.06   HEMOGLOBIN 13.4 14.3  --  14.8   HEMATOCRIT 41.3 43.0  --  44.3   PLATELETS 218 203  --  218   NEUTROS ABS 3.17 3.13  --   3.48   IMMATURE GRANS (ABS) 0.02 0.02  --  0.02   LYMPHS ABS 2.59 2.68  --  1.98   MONOS ABS 0.53 0.65  --  0.46   EOS ABS 0.19 0.15  --  0.08   MCV 91.4 92.3  --  92.5   LACTATE  --   --  0.7  --          Lab 11/27/24  0308 11/26/24  1750 11/24/24  1215   SODIUM 137 140 140   POTASSIUM 4.0 4.1 4.1   CHLORIDE 106 105 101   CO2 21.6* 25.6 25.1   ANION GAP 9.4 9.4 13.9   BUN 15 11 18   CREATININE 0.57 0.75 0.67   EGFR 132.0 115.6 126.9   GLUCOSE 93 71 99   CALCIUM 9.4 9.6 10.3   MAGNESIUM  --  2.1  --    TSH  --   --  1.470         Lab 11/24/24  1215   TOTAL PROTEIN 8.5   ALBUMIN 5.3*   GLOBULIN 3.2   ALT (SGPT) 16   AST (SGOT) 22   BILIRUBIN 0.5   ALK PHOS 65                         Lab Results   Component Value Date    TSH 1.470 11/24/2024       IMAGING STUDIES:  MRI Brain With Contrast    Result Date: 11/26/2024  Impression: No acute intracranial pathology. Electronically Signed: Trenton Garcia MD  11/26/2024 8:41 PM EST  Workstation ID: [id number]     I reviewed the patient's new clinical results.    ________________________________________________________     PROBLEM LIST:    Speech disturbance            ASSESSMENT/PLAN:    Whole body tremors while under severe stress  H/o absence epilepsy off ASM for several years and seizure free.  PNES vs focal seizures vs myoclonus  Stress and anxiety    POC glucose  CBC, BMP, ionized calcium, magnesium, LFTs, UA, UDS (may consider extended-spectrum drug screen)  Beta-hCG   Serum lactate (please obtain within 2 hours of seizure-like activity)  Prolactin  ECG  EEG  Load with LEV 1500 mg over 15 minutes followed by  mg BID  NCCT 11/24/2024 negative for acute intracranial abnormality.  MRI brain W/WO using epilepsy protocol: negative   MRA Head/neck to r/o aneurysms. Family concern for h/o aneurysms   Lumbar puncture can be deferred as there is no current concern for an acute infectious process involving the CNS.  PRN Ativan 2 mg IV for seizures lasting greater than 3  minutes.  Seizure precautions   I recommend the patient follow-up outpatient with an epileptologist.  There are no fellowship trained epileptologists in the [facility] region.  I recommend the patient follows longitudinally with an epileptologist once discharged either at [facility], [facility], or at a tertiary facility such as [facility].  Jillian Rocha MD @ [facility]   Fellowship trained in Epilepsy  [phone]  Anusha Wetzel DO @ [facility]   Fellowship trained in Epilepsy  [phone]  William Lambert MD @ [facility]   Fellowship trained in Clinical Neurophysiology  [phone]  Samantha Pinon MD @ [facility]   Fellowship trained in Clinical Neurophysiology  [phone]  Leigh Ann Pretty MD @ [facility]  Fellowship Trained in Epilepsy   [phone]      SEIZURE INSTRUCTIONS:     Recommended to observe all seizure precautions, including, but not limited to no driving until seizure free for more than 3 months- as per State driving regulation / law;   Avoid all high-risk activity,   Take showers instead of baths,   Avoid swimming without observation,   Avoid open heat sources,   Avoid working at heights and   Avoid engaging in all potentially hazardous activities.   Patient expressed clear understanding      I discussed the patient's findings and my recommendations with patient, family, nursing staff, and primary care team    Leo Ramírez MD  11/27/24  10:28 EST

## 2024-11-27 NOTE — PLAN OF CARE
Problem: Adult Inpatient Plan of Care  Goal: Absence of Hospital-Acquired Illness or Injury  Intervention: Identify and Manage Fall Risk  Recent Flowsheet Documentation  Taken 11/27/2024 0239 by Jaleesa Lizarraga, RN  Safety Promotion/Fall Prevention:   clutter free environment maintained   safety round/check completed   room organization consistent  Taken 11/27/2024 0016 by Jaleesa Lizarraga RN  Safety Promotion/Fall Prevention:   clutter free environment maintained   safety round/check completed   room organization consistent  Taken 11/26/2024 2215 by Jaleesa Lizarraga, RN  Safety Promotion/Fall Prevention:   clutter free environment maintained   room organization consistent   safety round/check completed  Taken 11/26/2024 2149 by Jaleesa Lizarraga RN  Safety Promotion/Fall Prevention:   clutter free environment maintained   room organization consistent   safety round/check completed  Intervention: Prevent Skin Injury  Recent Flowsheet Documentation  Taken 11/26/2024 2149 by Jaleesa Lizarraga, RN  Body Position: position changed independently  Intervention: Prevent Infection  Recent Flowsheet Documentation  Taken 11/27/2024 0239 by Jaleesa Lizarraga, RN  Infection Prevention:   environmental surveillance performed   rest/sleep promoted   single patient room provided  Taken 11/27/2024 0016 by Jaleesa Lizarraga, RN  Infection Prevention:   environmental surveillance performed   rest/sleep promoted   single patient room provided  Taken 11/26/2024 2215 by Jaleesa Lizarraga, RN  Infection Prevention:   environmental surveillance performed   single patient room provided   rest/sleep promoted  Taken 11/26/2024 2149 by Jaleesa Lizarraga, RN  Infection Prevention:   environmental surveillance performed   single patient room provided   rest/sleep promoted  Goal: Optimal Comfort and Wellbeing  Intervention: Provide Person-Centered Care  Recent Flowsheet Documentation  Taken 11/26/2024 2149 by Jaleesa Lizarraga, MARCIANO  Trust  Relationship/Rapport:   care explained   reassurance provided   thoughts/feelings acknowledged   Goal Outcome Evaluation:         Patient admitted to floor for speech disturbance. No complaints all shift. Pt NPO for EEG today.

## 2024-11-27 NOTE — PLAN OF CARE
Problem: Adult Inpatient Plan of Care  Goal: Plan of Care Review  Outcome: Progressing  Goal: Patient-Specific Goal (Individualized)  Outcome: Progressing  Goal: Absence of Hospital-Acquired Illness or Injury  Outcome: Progressing  Intervention: Identify and Manage Fall Risk  Recent Flowsheet Documentation  Taken 11/27/2024 1400 by Shahla Deal RN  Safety Promotion/Fall Prevention: patient off unit  Taken 11/27/2024 1000 by Shahla Deal RN  Safety Promotion/Fall Prevention:   safety round/check completed   assistive device/personal items within reach   clutter free environment maintained   nonskid shoes/slippers when out of bed   room organization consistent  Taken 11/27/2024 0830 by Shahla Deal RN  Safety Promotion/Fall Prevention:   safety round/check completed   assistive device/personal items within reach   clutter free environment maintained   nonskid shoes/slippers when out of bed   room organization consistent  Intervention: Prevent Skin Injury  Recent Flowsheet Documentation  Taken 11/27/2024 0830 by Shahla Deal RN  Body Position:   position changed independently   sitting up in bed  Skin Protection: transparent dressing maintained  Intervention: Prevent and Manage VTE (Venous Thromboembolism) Risk  Recent Flowsheet Documentation  Taken 11/27/2024 0830 by Shahla Deal RN  VTE Prevention/Management: SCDs (sequential compression devices) off  Intervention: Prevent Infection  Recent Flowsheet Documentation  Taken 11/27/2024 0830 by Shahla Deal RN  Infection Prevention:   hand hygiene promoted   rest/sleep promoted   single patient room provided  Goal: Optimal Comfort and Wellbeing  Outcome: Progressing  Intervention: Provide Person-Centered Care  Recent Flowsheet Documentation  Taken 11/27/2024 0830 by Shahla Deal RN  Trust Relationship/Rapport:   care explained   questions answered  Goal: Readiness for Transition of Care  Outcome: Progressing   Goal Outcome Evaluation:   Aox4, RA, no tele.  EEG completed and neuro consulted, awaiting results.

## 2024-11-28 ENCOUNTER — APPOINTMENT (OUTPATIENT)
Dept: MRI IMAGING | Facility: HOSPITAL | Age: 24
End: 2024-11-28
Payer: COMMERCIAL

## 2024-11-28 ENCOUNTER — READMISSION MANAGEMENT (OUTPATIENT)
Dept: CALL CENTER | Facility: HOSPITAL | Age: 24
End: 2024-11-28
Payer: COMMERCIAL

## 2024-11-28 VITALS
HEIGHT: 66 IN | RESPIRATION RATE: 16 BRPM | WEIGHT: 126.54 LBS | SYSTOLIC BLOOD PRESSURE: 91 MMHG | OXYGEN SATURATION: 98 % | HEART RATE: 65 BPM | BODY MASS INDEX: 20.34 KG/M2 | TEMPERATURE: 98.5 F | DIASTOLIC BLOOD PRESSURE: 58 MMHG

## 2024-11-28 LAB
ANION GAP SERPL CALCULATED.3IONS-SCNC: 11.5 MMOL/L (ref 5–15)
BASOPHILS # BLD AUTO: 0.05 10*3/MM3 (ref 0–0.2)
BASOPHILS NFR BLD AUTO: 0.9 % (ref 0–1.5)
BUN SERPL-MCNC: 12 MG/DL (ref 6–20)
BUN/CREAT SERPL: 20 (ref 7–25)
CALCIUM SPEC-SCNC: 9.4 MG/DL (ref 8.6–10.5)
CHLORIDE SERPL-SCNC: 105 MMOL/L (ref 98–107)
CO2 SERPL-SCNC: 21.5 MMOL/L (ref 22–29)
CREAT SERPL-MCNC: 0.6 MG/DL (ref 0.57–1)
DEPRECATED RDW RBC AUTO: 40.2 FL (ref 37–54)
EGFRCR SERPLBLD CKD-EPI 2021: 129.5 ML/MIN/1.73
EOSINOPHIL # BLD AUTO: 0.19 10*3/MM3 (ref 0–0.4)
EOSINOPHIL NFR BLD AUTO: 3.4 % (ref 0.3–6.2)
ERYTHROCYTE [DISTWIDTH] IN BLOOD BY AUTOMATED COUNT: 12.1 % (ref 12.3–15.4)
GLUCOSE SERPL-MCNC: 93 MG/DL (ref 65–99)
HCT VFR BLD AUTO: 39.4 % (ref 34–46.6)
HGB BLD-MCNC: 13.4 G/DL (ref 12–15.9)
IMM GRANULOCYTES # BLD AUTO: 0.02 10*3/MM3 (ref 0–0.05)
IMM GRANULOCYTES NFR BLD AUTO: 0.4 % (ref 0–0.5)
LYMPHOCYTES # BLD AUTO: 1.87 10*3/MM3 (ref 0.7–3.1)
LYMPHOCYTES NFR BLD AUTO: 33.5 % (ref 19.6–45.3)
MCH RBC QN AUTO: 30.7 PG (ref 26.6–33)
MCHC RBC AUTO-ENTMCNC: 34 G/DL (ref 31.5–35.7)
MCV RBC AUTO: 90.4 FL (ref 79–97)
MONOCYTES # BLD AUTO: 0.51 10*3/MM3 (ref 0.1–0.9)
MONOCYTES NFR BLD AUTO: 9.1 % (ref 5–12)
NEUTROPHILS NFR BLD AUTO: 2.95 10*3/MM3 (ref 1.7–7)
NEUTROPHILS NFR BLD AUTO: 52.7 % (ref 42.7–76)
NRBC BLD AUTO-RTO: 0 /100 WBC (ref 0–0.2)
PLATELET # BLD AUTO: 177 10*3/MM3 (ref 140–450)
PMV BLD AUTO: 8.7 FL (ref 6–12)
POTASSIUM SERPL-SCNC: 4 MMOL/L (ref 3.5–5.2)
RBC # BLD AUTO: 4.36 10*6/MM3 (ref 3.77–5.28)
SODIUM SERPL-SCNC: 138 MMOL/L (ref 136–145)
WBC NRBC COR # BLD AUTO: 5.59 10*3/MM3 (ref 3.4–10.8)

## 2024-11-28 PROCEDURE — 80048 BASIC METABOLIC PNL TOTAL CA: CPT | Performed by: EMERGENCY MEDICINE

## 2024-11-28 PROCEDURE — 25010000002 GADOTERIDOL PER 1 ML: Performed by: EMERGENCY MEDICINE

## 2024-11-28 PROCEDURE — 70549 MR ANGIOGRAPH NECK W/O&W/DYE: CPT

## 2024-11-28 PROCEDURE — G0378 HOSPITAL OBSERVATION PER HR: HCPCS

## 2024-11-28 PROCEDURE — 85025 COMPLETE CBC W/AUTO DIFF WBC: CPT | Performed by: EMERGENCY MEDICINE

## 2024-11-28 PROCEDURE — 70544 MR ANGIOGRAPHY HEAD W/O DYE: CPT

## 2024-11-28 PROCEDURE — A9579 GAD-BASE MR CONTRAST NOS,1ML: HCPCS | Performed by: EMERGENCY MEDICINE

## 2024-11-28 RX ORDER — LEVETIRACETAM 500 MG/1
500 TABLET ORAL 2 TIMES DAILY
Qty: 60 TABLET | Refills: 2 | Status: SHIPPED | OUTPATIENT
Start: 2024-11-28 | End: 2025-02-26

## 2024-11-28 RX ORDER — HYDROXYZINE HYDROCHLORIDE 25 MG/1
50 TABLET, FILM COATED ORAL 3 TIMES DAILY PRN
Qty: 30 TABLET | Refills: 0 | Status: CANCELLED | OUTPATIENT
Start: 2024-11-28

## 2024-11-28 RX ADMIN — LEVETIRACETAM 500 MG: 500 TABLET, FILM COATED ORAL at 08:59

## 2024-11-28 RX ADMIN — GADOTERIDOL 20 ML: 279.3 INJECTION, SOLUTION INTRAVENOUS at 03:15

## 2024-11-28 NOTE — PLAN OF CARE
Problem: Adult Inpatient Plan of Care  Goal: Plan of Care Review  11/28/2024 0452 by Yessica Porter RN  Outcome: Progressing  Flowsheets (Taken 11/28/2024 0452)  Outcome Evaluation:   sleeping well between mri and checks   pt without complaint   pt's broken speech continues   no tremors or twitching noted awake or sleeping   sats alway 98%>  11/28/2024 0159 by Yessica Porter RN  Outcome: Progressing  Goal: Patient-Specific Goal (Individualized)  11/28/2024 0452 by Yessica Porter RN  Outcome: Progressing  11/28/2024 0159 by Yessica Porter RN  Outcome: Progressing  Goal: Absence of Hospital-Acquired Illness or Injury  11/28/2024 0452 by Yessica Porter RN  Outcome: Progressing  11/28/2024 0159 by Yessica Porter RN  Outcome: Progressing  Intervention: Identify and Manage Fall Risk  Recent Flowsheet Documentation  Taken 11/28/2024 0400 by Yessica Porter RN  Safety Promotion/Fall Prevention:   safety round/check completed   nonskid shoes/slippers when out of bed   lighting adjusted  Taken 11/28/2024 0200 by Yessica Porter RN  Safety Promotion/Fall Prevention: safety round/check completed  Taken 11/28/2024 0000 by Yessica Porter RN  Safety Promotion/Fall Prevention:   safety round/check completed   nonskid shoes/slippers when out of bed   lighting adjusted  Taken 11/27/2024 2000 by Yessica Porter RN  Safety Promotion/Fall Prevention:   safety round/check completed   nonskid shoes/slippers when out of bed   lighting adjusted  Intervention: Prevent Skin Injury  Recent Flowsheet Documentation  Taken 11/27/2024 2000 by Yessica Porter RN  Body Position: dangle, side of bed  Goal: Optimal Comfort and Wellbeing  11/28/2024 0452 by Yessica Porter RN  Outcome: Progressing  11/28/2024 0159 by Yessica Porter RN  Outcome: Progressing  Goal: Readiness for Transition of Care  11/28/2024 0452 by Yessica Porter RN  Outcome: Progressing  11/28/2024 0159 by Yessica Porter RN  Outcome: Progressing   Goal Outcome  Evaluation:              Outcome Evaluation: sleeping well between mri and checks; pt without complaint; pt's broken speech continues; no tremors or twitching noted awake or sleeping; arsh chavez 98%>

## 2024-11-28 NOTE — OUTREACH NOTE
Prep Survey      Flowsheet Row Responses   [facility] patient discharged from? [facility]   Is LACE score < 7 ? Yes   Eligibility [facility]   Date of Admission 11/26/24   Date of Discharge 11/28/24   Discharge Disposition Home or Self Care   Discharge diagnosis Speech disturbance   Does the patient have one of the following disease processes/diagnoses(primary or secondary)? Other   Does the patient have Home health ordered? No   Is there a DME ordered? No   Prep survey completed? Yes            Janis TRIPLETT - Registered Nurse

## 2024-11-28 NOTE — PLAN OF CARE
Goal Outcome Evaluation:              Outcome Evaluation: sleeping well between mri and checks; pt without complaint; pt's broken speech continues; no tremors or twitching noted awake or sleeping; sats scott 98%>

## 2024-11-28 NOTE — PLAN OF CARE
Problem: Adult Inpatient Plan of Care  Goal: Plan of Care Review  Outcome: Met  Goal: Patient-Specific Goal (Individualized)  Outcome: Met  Goal: Absence of Hospital-Acquired Illness or Injury  Outcome: Met  Intervention: Identify and Manage Fall Risk  Recent Flowsheet Documentation  Taken 11/28/2024 1000 by Shahla Deal, RN  Safety Promotion/Fall Prevention:   safety round/check completed   assistive device/personal items within reach   clutter free environment maintained   nonskid shoes/slippers when out of bed   room organization consistent  Taken 11/28/2024 0810 by Shahla Deal, RN  Safety Promotion/Fall Prevention:   safety round/check completed   assistive device/personal items within reach   clutter free environment maintained   nonskid shoes/slippers when out of bed   room organization consistent  Intervention: Prevent Skin Injury  Recent Flowsheet Documentation  Taken 11/28/2024 0810 by Shahla Deal, RN  Body Position:   position changed independently   sitting up in bed  Intervention: Prevent Infection  Recent Flowsheet Documentation  Taken 11/28/2024 0810 by Shahla Deal, RN  Infection Prevention:   hand hygiene promoted   rest/sleep promoted   single patient room provided  Goal: Optimal Comfort and Wellbeing  Outcome: Met  Goal: Readiness for Transition of Care  Outcome: Met   Goal Outcome Evaluation:   D/C to home

## 2024-11-28 NOTE — PLAN OF CARE
Problem: Adult Inpatient Plan of Care  Goal: Plan of Care Review  Outcome: Progressing  Goal: Patient-Specific Goal (Individualized)  Outcome: Progressing  Goal: Absence of Hospital-Acquired Illness or Injury  Outcome: Progressing  Intervention: Identify and Manage Fall Risk  Recent Flowsheet Documentation  Taken 11/28/2024 0000 by Yessica Porter, RN  Safety Promotion/Fall Prevention:   safety round/check completed   nonskid shoes/slippers when out of bed   lighting adjusted  Taken 11/27/2024 2000 by Yessica Porter RN  Safety Promotion/Fall Prevention:   safety round/check completed   nonskid shoes/slippers when out of bed   lighting adjusted  Intervention: Prevent Skin Injury  Recent Flowsheet Documentation  Taken 11/27/2024 2000 by Yessica Porter, RN  Body Position: dangle, side of bed  Goal: Optimal Comfort and Wellbeing  Outcome: Progressing  Goal: Readiness for Transition of Care  Outcome: Progressing   Goal Outcome Evaluation:

## 2024-11-28 NOTE — DISCHARGE SUMMARY
[facility]    Ramírez Kelley NAIDA HOWARD    CHIEF COMPLAINT:     Tremor and speech disturbance    HISTORY OF PRESENT ILLNESS:    History of Present Illness  Obtained from ED provider HPI on 11/26/2024:  Patient is a 23-year-old female PMH significant for seizures presents to the ED with continued speech abnormality and tick patient states she was actually seen in our ED 2 days ago with all the same symptoms she states at that time she also had a headache but does not have one anymore.  States her symptoms have not gotten any worse or better she was told to follow-up with neurology on an outpatient basis as she left prior to inpatient neurology consult however, when they followed up with her PCP she told them that it would take months for her to get into neurology and that she should go back to the ER for further evaluation. patient has no new complaints.  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     11/27/24:  Patient confirms the HPI noted above including recent admission for similar symptoms that seem somewhat improved at the time of discharge however have subsequently worsened.  She notes that she has a significant stuttering speech pattern which has been present for the past 3-4 days with tremor in her left upper extremity while speaking.  She denies any falls or recent trauma and confirms compliance with outpatient medical therapies.  Patient notes that she was previously on seizure medications which she had been on since she was a child but that these were slowly tapered down and have subsequently been discontinued for some time.  She has had some headaches recently as well.  Patient denies any dyspnea, chest pain, recent cough or fever, neck or back pain.  She does not smoke and drinks alcohol rarely     11/28/2024:  Patient reports significant  improvement in tremor and though stuttering speech persists.  She was able to rest and no new or acute symptoms are noted.               Past Medical History:   Diagnosis Date    Epilepsy     Lorena Napoles at [facility]     History reviewed. No pertinent surgical history.  Family History   Problem Relation Age of Onset    Bipolar disorder Father     Schizophrenia Brother      Social History     Tobacco Use    Smoking status: Never     Passive exposure: Never    Smokeless tobacco: Never   Vaping Use    Vaping status: Never Used   Substance Use Topics    Alcohol use: Yes     Alcohol/week: 2.0 standard drinks of alcohol     Types: 1 Glasses of wine, 1 Shots of liquor per week     Comment: OCC DRINKER 1-2 DRINKS AT A TIME EITHER WINE OR COCKTAILS    Drug use: Yes     Comment: THC EDIABLES     Medications Prior to Admission   Medication Sig Dispense Refill Last Dose/Taking    butalbital-acetaminophen-caffeine (FIORICET, ESGIC) -40 MG per tablet Take 1 tablet by mouth Every 4 (Four) Hours As Needed for Headache. 6 tablet 0     hydrOXYzine (ATARAX) 25 MG tablet Take 1 tablet by mouth 3 (Three) Times a Day As Needed for Anxiety. 30 tablet 0     medroxyPROGESTERone (DEPO-PROVERA) 150 MG/ML injection Inject 1 mL into the appropriate muscle as directed by prescriber Every 3 (Three) Months.       ondansetron (ZOFRAN) 4 MG tablet Take 1 tablet by mouth Every 8 (Eight) Hours As Needed for Nausea or Vomiting.        Allergies:  Patient has no known allergies.    Immunization History   Administered Date(s) Administered    COVID-19 (MODERNA) 1st,2nd,3rd Dose Monovalent 04/22/2021, 05/20/2021    COVID-19 (MODERNA) Monovalent Original Booster 11/27/2021           REVIEW OF SYSTEMS:    Review of Systems   Constitutional: Negative.   Eyes: Negative.    Cardiovascular: Negative.    Respiratory: Negative.     Skin: Negative.    Musculoskeletal: Negative.    Gastrointestinal: Negative.    Genitourinary: Negative.     Neurological:  Positive for headaches and tremors.        Speech disturbance   Psychiatric/Behavioral: Negative.       Vital Signs  Temp:  [98 °F (36.7 °C)-98.6 °F (37 °C)] 98.5 °F (36.9 °C)  Heart Rate:  [] 65  Resp:  [16] 16  BP: ()/(58-84) 91/58          Physical Exam:  Physical Exam  Constitutional:       General: She is not in acute distress.     Appearance: Normal appearance. She is not ill-appearing, toxic-appearing or diaphoretic.   HENT:      Head: Normocephalic.      Right Ear: External ear normal.      Left Ear: External ear normal.      Nose: Nose normal.      Mouth/Throat:      Mouth: Mucous membranes are moist.   Eyes:      Extraocular Movements: Extraocular movements intact.   Cardiovascular:      Rate and Rhythm: Normal rate and regular rhythm.      Pulses: Normal pulses.   Pulmonary:      Effort: Pulmonary effort is normal.      Breath sounds: Normal breath sounds.   Abdominal:      General: Bowel sounds are normal.      Palpations: Abdomen is soft.   Musculoskeletal:      Cervical back: Normal range of motion.      Right lower leg: No edema.      Left lower leg: No edema.   Skin:     General: Skin is warm and dry.      Capillary Refill: Capillary refill takes less than 2 seconds.   Neurological:      General: No focal deficit present.      Mental Status: She is alert and oriented to person, place, and time.      Comments: Tremor has resolved though stuttering speech remains present   Psychiatric:         Mood and Affect: Mood normal.         Behavior: Behavior normal.         Thought Content: Thought content normal.         Judgment: Judgment normal.           Emotional Behavior:   Normal   Debilities:  None  Results Review:    I reviewed the patient's new clinical results.  Lab Results (most recent)       Procedure Component Value Units Date/Time    Basic Metabolic Panel [[id number]]  (Abnormal) Collected: 11/27/24 030    Specimen: Blood from Arm, Right Updated: 11/27/24 2886      Glucose 93 mg/dL      BUN 15 mg/dL      Creatinine 0.57 mg/dL      Sodium 137 mmol/L      Potassium 4.0 mmol/L      Chloride 106 mmol/L      CO2 21.6 mmol/L      Calcium 9.4 mg/dL      BUN/Creatinine Ratio 26.3     Anion Gap 9.4 mmol/L      eGFR 132.0 mL/min/1.73     Narrative:      GFR Normal >60  Chronic Kidney Disease <60  Kidney Failure <15      CBC & Differential [[id number]]  (Normal) Collected: 11/27/24 0308    Specimen: Blood from Arm, Right Updated: 11/27/24 0358    Narrative:      The following orders were created for panel order CBC & Differential.  Procedure                               Abnormality         Status                     ---------                               -----------         ------                     CBC Auto Differential[[id number]]        Normal              Final result                 Please view results for these tests on the individual orders.    CBC Auto Differential [[id number]]  (Normal) Collected: 11/27/24 0308    Specimen: Blood from Arm, Right Updated: 11/27/24 0358     WBC 6.54 10*3/mm3      RBC 4.52 10*6/mm3      Hemoglobin 13.4 g/dL      Hematocrit 41.3 %      MCV 91.4 fL      MCH 29.6 pg      MCHC 32.4 g/dL      RDW 12.4 %      RDW-SD 41.5 fl      MPV 8.7 fL      Platelets 218 10*3/mm3      Neutrophil % 48.5 %      Lymphocyte % 39.6 %      Monocyte % 8.1 %      Eosinophil % 2.9 %      Basophil % 0.6 %      Immature Grans % 0.3 %      Neutrophils, Absolute 3.17 10*3/mm3      Lymphocytes, Absolute 2.59 10*3/mm3      Monocytes, Absolute 0.53 10*3/mm3      Eosinophils, Absolute 0.19 10*3/mm3      Basophils, Absolute 0.04 10*3/mm3      Immature Grans, Absolute 0.02 10*3/mm3      nRBC 0.0 /100 WBC     Basic Metabolic Panel [[id number]]  (Normal) Collected: 11/26/24 1750    Specimen: Blood from Arm, Right Updated: 11/26/24 1824     Glucose 71 mg/dL      BUN 11 mg/dL      Creatinine 0.75 mg/dL      Sodium 140 mmol/L      Potassium 4.1 mmol/L      Chloride 105 mmol/L      CO2 25.6  mmol/L      Calcium 9.6 mg/dL      BUN/Creatinine Ratio 14.7     Anion Gap 9.4 mmol/L      eGFR 115.6 mL/min/1.73     Narrative:      GFR Normal >60  Chronic Kidney Disease <60  Kidney Failure <15      Magnesium [[id number]]  (Normal) Collected: 11/26/24 1750    Specimen: Blood from Arm, Right Updated: 11/26/24 1824     Magnesium 2.1 mg/dL     Urine Drug Screen - Urine, Clean Catch [[id number]]  (Abnormal) Collected: 11/26/24 1801    Specimen: Urine, Clean Catch Updated: 11/26/24 1824     THC, Screen, Urine Negative     Phencyclidine (PCP), Urine Negative     Cocaine Screen, Urine Negative     Methamphetamine, Ur Negative     Opiate Screen Negative     Amphetamine Screen, Urine Negative     Benzodiazepine Screen, Urine Positive     Tricyclic Antidepressants Screen Negative     Methadone Screen, Urine Negative     Barbiturates Screen, Urine Positive     Oxycodone Screen, Urine Negative     Buprenorphine, Screen, Urine Negative    Narrative:      Cutoff For Drugs Screened:    Amphetamines               500 ng/ml  Barbiturates               200 ng/ml  Benzodiazepines            150 ng/ml  Cocaine                    150 ng/ml  Methadone                  200 ng/ml  Opiates                    100 ng/ml  Phencyclidine               25 ng/ml  THC                         50 ng/ml  Methamphetamine            500 ng/ml  Tricyclic Antidepressants  300 ng/ml  Oxycodone                  100 ng/ml  Buprenorphine               10 ng/ml    The normal value for all drugs tested is negative. This report includes unconfirmed screening results, with the cutoff values listed, to be used for medical treatment purposes only.  Unconfirmed results must not be used for non-medical purposes such as employment or legal testing.  Clinical consideration should be applied to any drug of abuse test, particularly when unconfirmed results are used.    All urine drugs of abuse requests without chain of custody are for medical screening purposes only.   False positives are possible.      CBC & Differential [[id number]]  (Normal) Collected: 11/26/24 1750    Specimen: Blood from Arm, Right Updated: 11/26/24 1755    Narrative:      The following orders were created for panel order CBC & Differential.  Procedure                               Abnormality         Status                     ---------                               -----------         ------                     CBC Auto Differential[[id number]]        Normal              Final result                 Please view results for these tests on the individual orders.    CBC Auto Differential [[id number]]  (Normal) Collected: 11/26/24 1750    Specimen: Blood from Arm, Right Updated: 11/26/24 1755     WBC 6.69 10*3/mm3      RBC 4.66 10*6/mm3      Hemoglobin 14.3 g/dL      Hematocrit 43.0 %      MCV 92.3 fL      MCH 30.7 pg      MCHC 33.3 g/dL      RDW 12.3 %      RDW-SD 41.9 fl      MPV 8.9 fL      Platelets 203 10*3/mm3      Neutrophil % 46.8 %      Lymphocyte % 40.1 %      Monocyte % 9.7 %      Eosinophil % 2.2 %      Basophil % 0.9 %      Immature Grans % 0.3 %      Neutrophils, Absolute 3.13 10*3/mm3      Lymphocytes, Absolute 2.68 10*3/mm3      Monocytes, Absolute 0.65 10*3/mm3      Eosinophils, Absolute 0.15 10*3/mm3      Basophils, Absolute 0.06 10*3/mm3      Immature Grans, Absolute 0.02 10*3/mm3      nRBC 0.0 /100 WBC             Imaging Results (Most Recent)       Procedure Component Value Units Date/Time    MRI Angiogram Neck With & Without Contrast [[id number]] Collected: 11/28/24 0534     Updated: 11/28/24 0542    Narrative:      MRI ANGIOGRAM NECK W WO CONTRAST, MRI ANGIOGRAM HEAD WO CONTRAST    Date of Exam: 11/28/2024 2:38 AM EST    Indication: seizures.     Comparison: None available.    Technique:  Routine 3-D time-of-flight gradient echo imaging was obtained of the neck before and after the uneventful administration of 20 mL of Prohance.    Findings:  Aortic arch: The aortic arch is unremarkable.   There is conventional 3 vessel arch anatomy.  The right brachiocephalic and visualized bilateral subclavian arteries are within normal limits.    Right carotid: The right CCA arises as expected from the brachiocephalic trunk.  The CCA follows a normal course and appears normal caliber.  The carotid bifurcation is unremarkable.  The external carotid artery and distal branches appear within normal   limits.  The cervical internal carotid artery follows a normal course and appears normal caliber throughout the neck and into the head.  The intracranial ICA segments appear within normal limits.  The ophthalmic artery origin is normal.  The A1 and M1   segments appear within normal limits.  The visualized distal RAÚL and MCA branches appear patent.  There is  a patent  anterior communicating artery. There is a patent  posterior communicating artery.    Left carotid: The left CCA arises as expected from the aortic arch.   The CCA follows a normal course and appears normal caliber.  The carotid bifurcation is unremarkable.  The external carotid artery and distal branches appear within normal limits.  The   cervical internal carotid artery follows a normal course and appears normal caliber throughout the neck and into the head.  The intracranial ICA segments appear within normal limits.  The ophthalmic artery origin is normal.  The A1 and M1 segments   appear within normal limits.  The visualized distal RAÚL and MCA branches appear patent. There is a patent  posterior communicating artery.    Posterior circulation: Vertebral arteries arise as expected from ipsilateral subclavian arteries.  The vertebral arteries are codominant.  The vertebral arteries follow a normal course and appear normal caliber throughout the neck and into the head.  The   V4 segments are patent.  The vertebral arteries are codominant.   The V4 segments are patent.  Visualized posterior inferior cerebellar arteries are within normal limits.  The basilar  artery is normal caliber.  Superior cerebellar arteries are patent.   There is fetal origin of the right posterior cerebral artery. Left posterior cerebral artery is normal.        Impression:      Impression:  1.Normal MR angiogram of the neck.  2.Normal MR angiogram of the head.        Electronically Signed: Josafat Hernandez MD    11/28/2024 5:40 AM EST    Workstation ID: [id number]    MRI Angiogram Head Without Contrast [[id number]] Collected: 11/28/24 0534     Updated: 11/28/24 0542    Narrative:      MRI ANGIOGRAM NECK W WO CONTRAST, MRI ANGIOGRAM HEAD WO CONTRAST    Date of Exam: 11/28/2024 2:38 AM EST    Indication: seizures.     Comparison: None available.    Technique:  Routine 3-D time-of-flight gradient echo imaging was obtained of the neck before and after the uneventful administration of 20 mL of Prohance.    Findings:  Aortic arch: The aortic arch is unremarkable.  There is conventional 3 vessel arch anatomy.  The right brachiocephalic and visualized bilateral subclavian arteries are within normal limits.    Right carotid: The right CCA arises as expected from the brachiocephalic trunk.  The CCA follows a normal course and appears normal caliber.  The carotid bifurcation is unremarkable.  The external carotid artery and distal branches appear within normal   limits.  The cervical internal carotid artery follows a normal course and appears normal caliber throughout the neck and into the head.  The intracranial ICA segments appear within normal limits.  The ophthalmic artery origin is normal.  The A1 and M1   segments appear within normal limits.  The visualized distal RAÚL and MCA branches appear patent.  There is  a patent  anterior communicating artery. There is a patent  posterior communicating artery.    Left carotid: The left CCA arises as expected from the aortic arch.   The CCA follows a normal course and appears normal caliber.  The carotid bifurcation is unremarkable.  The external carotid artery and  distal branches appear within normal limits.  The   cervical internal carotid artery follows a normal course and appears normal caliber throughout the neck and into the head.  The intracranial ICA segments appear within normal limits.  The ophthalmic artery origin is normal.  The A1 and M1 segments   appear within normal limits.  The visualized distal RAÚL and MCA branches appear patent. There is a patent  posterior communicating artery.    Posterior circulation: Vertebral arteries arise as expected from ipsilateral subclavian arteries.  The vertebral arteries are codominant.  The vertebral arteries follow a normal course and appear normal caliber throughout the neck and into the head.  The   V4 segments are patent.  The vertebral arteries are codominant.   The V4 segments are patent.  Visualized posterior inferior cerebellar arteries are within normal limits.  The basilar artery is normal caliber.  Superior cerebellar arteries are patent.   There is fetal origin of the right posterior cerebral artery. Left posterior cerebral artery is normal.        Impression:      Impression:  1.Normal MR angiogram of the neck.  2.Normal MR angiogram of the head.        Electronically Signed: Josafat Hernandez MD    11/28/2024 5:40 AM EST    Workstation ID: [id number]    MRI Brain With Contrast [[id number]] Collected: 11/26/24 2034     Updated: 11/26/24 2043    Narrative:      MRI BRAIN W CONTRAST    Date of Exam: 11/26/2024 8:12 PM EST    Indication: tremor and speech difficulty.     Comparison: None available.    Technique:  Routine multiplanar/multisequence sequence images of the brain were obtained after the uneventful administration of 15 cc Prohance.      Findings: The ventricles and sulci are normal in size and configuration. No intracranial mass or mass effect. No extra-axial mass or collection. The posterior fossa is normal. Sellar and suprasellar structures are normal. The major intracranial   flow-voids of the [place]  are patent. No abnormal intracranial enhancement.    Orbital and periorbital soft tissues are normal. The visualized paranasal sinuses and ethmoid air cells are aerated. The mastoid air cells are aerated..      Impression:      Impression: No acute intracranial pathology.      Electronically Signed: Trenton Garcia MD    11/26/2024 8:41 PM EST    Workstation ID: [id number]          reviewed    ECG/EMG Results (most recent)       None          not reviewed            Microbiology Results (last 10 days)       ** No results found for the last 240 hours. **            Assessment & Plan     Speech disturbance       Speech disturbance with tremor  -CBC and BMP generally unremarkable  -UA positive for benzodiazepines and barbiturates (patient has been given these medications recently in the hospital  -MRI of brain ordered in the ED showed no acute intracranial process  -Neurology consulted, EEG ordered and neurology initiated to twice daily Keppra  -Maintain seizure precautions  -Patient given as needed Atarax for increased stress/anxiety as well    I discussed the patients findings and my recommendations with patient and nursing staff.     Discharge Diagnosis:      Speech disturbance      Hospital Course  Patient is a 24 y.o. female presented with persistent speech disturbance and tremor with an HPI noted above.  CMP and CBC were assessed and found to be generally unremarkable and urine tox obtained in the ED was positive for benzodiazepines and barbiturates.  Neurology was consulted in the ED and MRI of brain was ordered which showed no acute process.  EEG was subsequently obtained on 11/27/2024 and neurology subsequently initiated twice daily Keppra with improvement in tremor though speech disturbance persists.  Discussed case with neurology who recommended continuing 500 mg twice daily Keppra outpatient and close follow-up with outpatient neurologist preferably 1 specializing in epilepsy.  Referral was made to physician  with epilepsy clinic at [facility] at this time patient is felt to be in good condition for discharge with close follow-up with her PCP as well as neurology on an outpatient basis.  Her full testing/results and plan were discussed with patient along with concerning/alarm symptoms for which to call 911/return to the ED. Keppra will be prescribed at discharge.  All questions were answered and she verbalizes her understanding and agreement.    Past Medical History:     Past Medical History:   Diagnosis Date    Epilepsy     Lorena Napoles at [facility]       Past Surgical History:   History reviewed. No pertinent surgical history.    Social History:   Social History     Socioeconomic History    Marital status: Single   Tobacco Use    Smoking status: Never     Passive exposure: Never    Smokeless tobacco: Never   Vaping Use    Vaping status: Never Used   Substance and Sexual Activity    Alcohol use: Yes     Alcohol/week: 2.0 standard drinks of alcohol     Types: 1 Glasses of wine, 1 Shots of liquor per week     Comment: OCC DRINKER 1-2 DRINKS AT A TIME EITHER WINE OR COCKTAILS    Drug use: Yes     Comment: THC EDIABLES    Sexual activity: Not Currently       Procedures Performed    11/27 1847 EEG    Consults:   Consults       Date and Time Order Name Status Description    11/26/2024  5:48 PM Inpatient Neurology Consult Other (see comments) Completed     11/26/2024  5:05 PM Inpatient Neurology Consult Other (see comments) Completed             Condition on Discharge:     Stable    Discharge Disposition  Home or Self Care    Discharge Medications     Discharge Medications        New Medications        Instructions Start Date   levETIRAcetam 500 MG tablet  Commonly known as: KEPPRA   500 mg, Oral, 2 Times Daily             Continue These Medications        Instructions Start Date   butalbital-acetaminophen-caffeine -40 MG per tablet  Commonly known as: FIORICET, ESGIC   1 tablet, Oral,  Every 4 Hours PRN      hydrOXYzine 25 MG tablet  Commonly known as: ATARAX   25 mg, Oral, 3 Times Daily PRN      medroxyPROGESTERone 150 MG/ML injection  Commonly known as: DEPO-PROVERA   150 mg, Every 3 Months      ondansetron 4 MG tablet  Commonly known as: ZOFRAN   4 mg, Oral, Every 8 Hours PRN               Discharge Diet:     Activity at Discharge:   Activity Instructions       Work Restrictions      Type of Restriction: Work    May Return to Work: After Next Appointment    With / Without Restrictions: Without Restrictions            Follow-up Appointments  Future Appointments   Date Time Provider Department Center   1/16/2025  3:15 PM NURSE/[facility]     Additional Instructions for the Follow-ups that You Need to Schedule       Ambulatory Referral to Neurology   As directed      Discharge Follow-up with PCP   As directed       Currently Documented PCP:    Kelley Elmore APRN    PCP Phone Number:    [phone]     Follow Up Details: 5 to 7 days                Test Results Pending at Discharge  Pending Results       None             Risk for Readmission (LACE) Score: 4 (11/28/2024  6:00 AM)      Greater than 30 minutes spent in discharge activities for this patient    Signature:Electronically signed by Shaun Sequeira PA-C, 11/28/24, 10:15 AM EST.

## 2024-11-29 ENCOUNTER — TRANSITIONAL CARE MANAGEMENT TELEPHONE ENCOUNTER (OUTPATIENT)
Dept: CALL CENTER | Facility: HOSPITAL | Age: 24
End: 2024-11-29
Payer: COMMERCIAL

## 2024-11-29 NOTE — OUTREACH NOTE
Call Center TCM Note      Flowsheet Row Responses   [facility], [place] patient discharged from? [facility]   Does the patient have one of the following disease processes/diagnoses(primary or secondary)? Other   TCM attempt successful? Yes   Call start time 1659   Call end time 1703   Discharge diagnosis Speech disturbance   Is patient permission given to speak with other caregiver? No   Person spoke with today (if not patient) and relationship Patient   Medication alerts for this patient Keppra   Meds reviewed with patient/caregiver? Yes   Is the patient having any side effects they believe may be caused by any medication additions or changes? No   Does the patient have all medications ordered at discharge? Yes   Is the patient taking all medications as directed (includes completed medication regime)? Yes   Comments Assisted patient in scheduling a hospital f/u appt with PCP. TCM PCP hospital f/u appt scheduled for 12/4/24 at 10:15 AM with NAIDA Heredia.   Does the patient have an appointment with their PCP within 7-14 days of discharge? No   Nursing Interventions Assisted patient with making appointment per protocol   Has home health visited the patient within 72 hours of discharge? N/A   Psychosocial issues? No   Comments Patient states her speech is still impaired, stuttering. No further tremors.   Did the patient receive a copy of their discharge instructions? Yes   Nursing interventions Reviewed instructions with patient   What is the patient's perception of their health status since discharge? Improving   Is the patient/caregiver able to teach back signs and symptoms related to disease process for when to call PCP? Yes   Is the patient/caregiver able to teach back signs and symptoms related to disease process for when to call 911? Yes   Is the patient/caregiver able to teach back the hierarchy of who to call/visit for symptoms/problems? PCP, Specialist, Home health nurse, Urgent Care, ED, 911 Yes   If the  patient is a current smoker, are they able to teach back resources for cessation? Not a smoker   TCM call completed? Yes   Wrap up additional comments Assisted patient in scheduling a hospital f/u appt with PCP. TCM PCP hospital f/u appt scheduled for 12/4/24 at 10:15 AM with NAIDA Heredia.   Call end time 1703   Would this patient benefit from a Referral to [facility]? No   Is the patient interested in additional calls from an ambulatory ? No            Julia Cueva RN    11/29/2024, 17:04 EST

## 2024-11-29 NOTE — OUTREACH NOTE
Call Center TCM Note      Flowsheet Row Responses   [facility] facility patient discharged from? [facility]   Does the patient have one of the following disease processes/diagnoses(primary or secondary)? Other   TCM attempt successful? No   Unsuccessful attempts Attempt 1  [Limited BH PCP verbal release]            Julia Cueva RN    11/29/2024, 12:41 EST

## 2024-12-02 NOTE — PROGRESS NOTES
"Transitional Care Follow Up Visit  Subjective     Jing ANITA Vazquez is a 24 y.o. female who presents for a transitional care management visit.    Within 48 business hours after discharge our office contacted her via telephone to coordinate her care and needs.      I reviewed and discussed the details of that call along with the discharge summary, hospital problems, inpatient lab results, inpatient diagnostic studies, and consultation reports with Jing.     Current outpatient and discharge medications have been reconciled for the patient.  Reviewed by: NAIDA Pace          11/28/2024     1:01 PM   Date of TCM Phone Call   [facility]   Date of Admission 11/26/2024   Date of Discharge 11/28/2024   Discharge Disposition Home or Self Care     Risk for Readmission (LACE) Score: 4 (11/28/2024  6:00 AM)      History of Present Illness   Course During Hospital Stay:  Hospital course per hospital record: \"Patient is a 24 y.o. female presented with persistent speech disturbance and tremor with an HPI noted above.  CMP and CBC were assessed and found to be generally unremarkable and urine tox obtained in the ED was positive for benzodiazepines and barbiturates.  Neurology was consulted in the ED and MRI of brain was ordered which showed no acute process.  EEG was subsequently obtained on 11/27/2024 and neurology subsequently initiated twice daily Keppra with improvement in tremor though speech disturbance persists.  Discussed case with neurology who recommended continuing 500 mg twice daily Keppra outpatient and close follow-up with outpatient neurologist preferably 1 specializing in epilepsy.  Referral was made to physician with epilepsy clinic at [facility] at this time patient is felt to be in good condition for discharge with close follow-up with her PCP as well as neurology on an outpatient basis.  Her full testing/results and plan were discussed with patient along with " "concerning/alarm symptoms for which to call 911/return to the ED. Keppra will be prescribed at discharge.  All questions were answered and she verbalizes her understanding and agreement. \"    Scheduled with [facility] on 12/19.  Last day of work was 11/26; needs FMLA to cover until seen by neuro.    Tremor has improved; still has stutter with speech.    Anxiety - has hydroxyzine PRN but feels she may need something more.  Has had recent increase in stress.  Denies any SI or HI.      The following portions of the patient's history were reviewed and updated as appropriate: allergies, current medications, past family history, past medical history, past social history, past surgical history, and problem list.    Review of Systems   Constitutional:  Negative for chills, fatigue and fever.   Respiratory:  Negative for cough, shortness of breath and wheezing.    Cardiovascular:  Negative for chest pain and palpitations.   Gastrointestinal:  Negative for diarrhea, nausea and vomiting.   Neurological:  Positive for speech difficulty. Negative for dizziness, tremors, seizures, syncope, weakness, light-headedness and headaches.   Psychiatric/Behavioral:  Negative for dysphoric mood, self-injury and suicidal ideas. The patient is nervous/anxious.        Objective   /82 (BP Location: Left arm, Patient Position: Sitting, Cuff Size: Adult)   Pulse 89   Ht 167.6 cm (66\")   Wt 58.1 kg (128 lb)   SpO2 100%   BMI 20.66 kg/m²   Physical Exam  Vitals reviewed.   Constitutional:       General: She is not in acute distress.     Appearance: Normal appearance.   HENT:      Head: Normocephalic and atraumatic.   Cardiovascular:      Rate and Rhythm: Normal rate and regular rhythm.      Pulses: Normal pulses.      Heart sounds: Normal heart sounds. No murmur heard.  Pulmonary:      Effort: Pulmonary effort is normal. No respiratory distress.      Breath sounds: Normal breath sounds. No wheezing or rhonchi.   Chest:      " Chest wall: No tenderness.   Abdominal:      Tenderness: There is no right CVA tenderness or left CVA tenderness.   Musculoskeletal:      Cervical back: Normal range of motion and neck supple.   Skin:     General: Skin is warm and dry.   Neurological:      Mental Status: She is alert and oriented to person, place, and time. Mental status is at baseline.   Psychiatric:         Mood and Affect: Mood is anxious.         Speech: Speech is slurred (stutter).         Behavior: Behavior is cooperative.         Thought Content: Thought content does not include homicidal or suicidal ideation.         Assessment & Plan   Diagnoses and all orders for this visit:    1. Speech disturbance, unspecified type (Primary)  Comments:  Cont. current medication.   Scheduled with [facility].    2. Tremor  Comments:  Cont. current medication.   Scheduled with [facility].    3. Anxiety  Comments:  Given escitalopram 10 mg daily.   RTO in 4-6 weeks.  Orders:  -     escitalopram (Lexapro) 10 MG tablet; Take 1 tablet by mouth Daily.  Dispense: 30 tablet; Refill: 3

## 2024-12-04 ENCOUNTER — OFFICE VISIT (OUTPATIENT)
Dept: FAMILY MEDICINE CLINIC | Facility: CLINIC | Age: 24
End: 2024-12-04
Payer: COMMERCIAL

## 2024-12-04 VITALS
HEIGHT: 66 IN | OXYGEN SATURATION: 100 % | BODY MASS INDEX: 20.57 KG/M2 | HEART RATE: 89 BPM | WEIGHT: 128 LBS | DIASTOLIC BLOOD PRESSURE: 82 MMHG | SYSTOLIC BLOOD PRESSURE: 122 MMHG

## 2024-12-04 DIAGNOSIS — R47.9 SPEECH DISTURBANCE, UNSPECIFIED TYPE: Primary | ICD-10-CM

## 2024-12-04 DIAGNOSIS — R25.1 TREMOR: ICD-10-CM

## 2024-12-04 DIAGNOSIS — F41.9 ANXIETY: ICD-10-CM

## 2024-12-04 PROCEDURE — 99495 TRANSJ CARE MGMT MOD F2F 14D: CPT | Performed by: NURSE PRACTITIONER

## 2024-12-04 RX ORDER — ESCITALOPRAM OXALATE 10 MG/1
10 TABLET ORAL DAILY
Qty: 30 TABLET | Refills: 3 | Status: SHIPPED | OUTPATIENT
Start: 2024-12-04

## 2025-01-06 NOTE — PROGRESS NOTES
Subjective   Jing Vazquez is a 24 y.o. female.       HPI   Pt is here today for 6 week follow up on anxiety.   Currently on escitalopram 10 mg daily.    She feels some improvement in her anxiety but still has some issues with the stutter to her speech.  She is seeing neurology at [facility]; completed a 72 hour EEG over the weekend.  Scheduled with Dr. Beltran in Feb to go over results.  She says neurology has her off work until that follow up.  She states her neurologist believes symptoms are related more to anxiety than anything else.  She remains on Keppra.  No seizure reported.    Denies any SI or HI.      The following portions of the patient's history were reviewed and updated as appropriate: allergies, current medications, past family history, past medical history, past social history, past surgical history, and problem list.    Review of Systems   Constitutional:  Negative for chills, fatigue and fever.   Respiratory:  Negative for cough, shortness of breath and wheezing.    Cardiovascular:  Negative for chest pain and palpitations.   Gastrointestinal:  Negative for diarrhea, nausea and vomiting.   Neurological:  Positive for speech difficulty. Negative for dizziness, tremors, seizures, syncope, facial asymmetry, weakness, light-headedness, numbness, headache, memory problem and confusion.   Psychiatric/Behavioral:  Negative for self-injury, suicidal ideas and depressed mood. The patient is nervous/anxious.        Objective   Physical Exam  Vitals reviewed.   Constitutional:       General: She is not in acute distress.     Appearance: Normal appearance.   Cardiovascular:      Rate and Rhythm: Normal rate and regular rhythm.      Pulses: Normal pulses.      Heart sounds: Normal heart sounds. No murmur heard.  Pulmonary:      Effort: Pulmonary effort is normal. No respiratory distress.      Breath sounds: Normal breath sounds. No wheezing or rhonchi.   Chest:      Chest wall: No tenderness.   Neurological:       Mental Status: She is alert and oriented to person, place, and time. Mental status is at baseline.   Psychiatric:         Mood and Affect: Mood normal.         Speech: Speech is delayed (stutter).         Behavior: Behavior is cooperative.         Thought Content: Thought content does not include homicidal or suicidal ideation.         BMI is within normal parameters. No other follow-up for BMI required.       Procedures   Assessment & Plan   Diagnoses and all orders for this visit:    1. Anxiety (Primary)  Comments:  Increasing escitalopram to 20 mg daily.    Message f/u in 2 weeks.   Keep f/u with neurology next month.  Orders:  -     escitalopram (LEXAPRO) 20 MG tablet; Take 1 tablet by mouth Daily.  Dispense: 90 tablet; Refill: 1

## 2025-01-07 ENCOUNTER — OFFICE VISIT (OUTPATIENT)
Dept: FAMILY MEDICINE CLINIC | Facility: CLINIC | Age: 25
End: 2025-01-07
Payer: COMMERCIAL

## 2025-01-07 VITALS
DIASTOLIC BLOOD PRESSURE: 72 MMHG | HEART RATE: 83 BPM | TEMPERATURE: 98.7 F | WEIGHT: 128 LBS | OXYGEN SATURATION: 97 % | BODY MASS INDEX: 20.57 KG/M2 | SYSTOLIC BLOOD PRESSURE: 129 MMHG | HEIGHT: 66 IN

## 2025-01-07 DIAGNOSIS — F41.9 ANXIETY: Primary | ICD-10-CM

## 2025-01-07 PROCEDURE — 99214 OFFICE O/P EST MOD 30 MIN: CPT | Performed by: NURSE PRACTITIONER

## 2025-01-07 RX ORDER — ESCITALOPRAM OXALATE 20 MG/1
20 TABLET ORAL DAILY
Qty: 90 TABLET | Refills: 1 | Status: SHIPPED | OUTPATIENT
Start: 2025-01-07

## 2025-01-17 ENCOUNTER — CLINICAL SUPPORT (OUTPATIENT)
Dept: FAMILY MEDICINE CLINIC | Facility: CLINIC | Age: 25
End: 2025-01-17
Payer: COMMERCIAL

## 2025-01-17 DIAGNOSIS — Z30.42 ENCOUNTER FOR SURVEILLANCE OF INJECTABLE CONTRACEPTIVE: ICD-10-CM

## 2025-01-17 DIAGNOSIS — Z30.019 ENCOUNTER FOR FEMALE BIRTH CONTROL: Primary | ICD-10-CM

## 2025-01-17 RX ORDER — MEDROXYPROGESTERONE ACETATE 150 MG/ML
150 INJECTION, SUSPENSION INTRAMUSCULAR ONCE
Status: COMPLETED | OUTPATIENT
Start: 2025-01-17 | End: 2025-01-17

## 2025-01-17 RX ADMIN — MEDROXYPROGESTERONE ACETATE 150 MG: 150 INJECTION, SUSPENSION INTRAMUSCULAR at 13:36

## 2025-04-14 ENCOUNTER — CLINICAL SUPPORT (OUTPATIENT)
Dept: FAMILY MEDICINE CLINIC | Facility: CLINIC | Age: 25
End: 2025-04-14
Payer: COMMERCIAL

## 2025-04-14 DIAGNOSIS — Z30.42 ENCOUNTER FOR SURVEILLANCE OF INJECTABLE CONTRACEPTIVE: Primary | ICD-10-CM

## 2025-04-14 PROCEDURE — 96372 THER/PROPH/DIAG INJ SC/IM: CPT | Performed by: NURSE PRACTITIONER

## 2025-04-14 RX ORDER — MEDROXYPROGESTERONE ACETATE 150 MG/ML
150 INJECTION, SUSPENSION INTRAMUSCULAR ONCE
Status: COMPLETED | OUTPATIENT
Start: 2025-04-14 | End: 2025-04-14

## 2025-04-14 RX ADMIN — MEDROXYPROGESTERONE ACETATE 150 MG: 150 INJECTION, SUSPENSION INTRAMUSCULAR at 13:43

## 2025-07-07 ENCOUNTER — CLINICAL SUPPORT (OUTPATIENT)
Dept: FAMILY MEDICINE CLINIC | Facility: CLINIC | Age: 25
End: 2025-07-07
Payer: COMMERCIAL

## 2025-07-07 DIAGNOSIS — Z30.019 ENCOUNTER FOR FEMALE BIRTH CONTROL: Primary | ICD-10-CM

## 2025-07-07 PROCEDURE — 81025 URINE PREGNANCY TEST: CPT | Performed by: NURSE PRACTITIONER

## 2025-07-07 PROCEDURE — 96372 THER/PROPH/DIAG INJ SC/IM: CPT | Performed by: NURSE PRACTITIONER

## 2025-07-07 RX ORDER — MEDROXYPROGESTERONE ACETATE 150 MG/ML
150 INJECTION, SUSPENSION INTRAMUSCULAR ONCE
Status: COMPLETED | OUTPATIENT
Start: 2025-07-07 | End: 2025-07-07

## 2025-07-07 RX ADMIN — MEDROXYPROGESTERONE ACETATE 150 MG: 150 INJECTION, SUSPENSION INTRAMUSCULAR at 14:40

## 2025-08-11 DIAGNOSIS — F41.9 ANXIETY: ICD-10-CM

## 2025-08-11 RX ORDER — ESCITALOPRAM OXALATE 20 MG/1
20 TABLET ORAL DAILY
Qty: 30 TABLET | Refills: 5 | Status: SHIPPED | OUTPATIENT
Start: 2025-08-11